# Patient Record
Sex: FEMALE | Race: WHITE | Employment: UNEMPLOYED | URBAN - METROPOLITAN AREA
[De-identification: names, ages, dates, MRNs, and addresses within clinical notes are randomized per-mention and may not be internally consistent; named-entity substitution may affect disease eponyms.]

---

## 2017-02-01 ENCOUNTER — OFFICE VISIT (OUTPATIENT)
Dept: FAMILY MEDICINE CLINIC | Age: 37
End: 2017-02-01

## 2017-02-01 VITALS
DIASTOLIC BLOOD PRESSURE: 87 MMHG | BODY MASS INDEX: 31.5 KG/M2 | OXYGEN SATURATION: 100 % | TEMPERATURE: 97.9 F | HEART RATE: 92 BPM | HEIGHT: 63 IN | SYSTOLIC BLOOD PRESSURE: 131 MMHG | RESPIRATION RATE: 16 BRPM | WEIGHT: 177.8 LBS

## 2017-02-01 DIAGNOSIS — F98.8 ADD (ATTENTION DEFICIT DISORDER): ICD-10-CM

## 2017-02-01 DIAGNOSIS — R87.618 OTHER ABNORMAL CYTOLOGICAL FINDING OF SPECIMEN FROM CERVIX: ICD-10-CM

## 2017-02-01 DIAGNOSIS — F32.89 OTHER DEPRESSION: Primary | ICD-10-CM

## 2017-02-01 RX ORDER — DEXTROAMPHETAMINE SACCHARATE, AMPHETAMINE ASPARTATE, DEXTROAMPHETAMINE SULFATE AND AMPHETAMINE SULFATE 7.5; 7.5; 7.5; 7.5 MG/1; MG/1; MG/1; MG/1
30 TABLET ORAL 2 TIMES DAILY
Qty: 60 TAB | Refills: 0 | Status: SHIPPED | OUTPATIENT
Start: 2017-02-01 | End: 2017-04-28 | Stop reason: SDUPTHER

## 2017-02-01 RX ORDER — DEXTROAMPHETAMINE SACCHARATE, AMPHETAMINE ASPARTATE, DEXTROAMPHETAMINE SULFATE AND AMPHETAMINE SULFATE 7.5; 7.5; 7.5; 7.5 MG/1; MG/1; MG/1; MG/1
30 TABLET ORAL 2 TIMES DAILY
Qty: 60 TAB | Refills: 0 | Status: SHIPPED | OUTPATIENT
Start: 2017-04-01 | End: 2017-04-28 | Stop reason: SDUPTHER

## 2017-02-01 RX ORDER — DEXTROAMPHETAMINE SACCHARATE, AMPHETAMINE ASPARTATE, DEXTROAMPHETAMINE SULFATE AND AMPHETAMINE SULFATE 7.5; 7.5; 7.5; 7.5 MG/1; MG/1; MG/1; MG/1
30 TABLET ORAL 2 TIMES DAILY
Qty: 60 TAB | Refills: 0 | Status: SHIPPED | OUTPATIENT
Start: 2017-03-01 | End: 2017-04-28 | Stop reason: SDUPTHER

## 2017-02-01 NOTE — PATIENT INSTRUCTIONS
Attention Deficit Hyperactivity Disorder (ADHD) in Adults: Care Instructions  Your Care Instructions  Attention deficit hyperactivity disorder, or ADHD, is a condition that makes it hard to pay attention. So you may have problems when you try to focus, get organized, and finish tasks. It might make you more active than other people. Or you might do things without thinking first.  ADHD is very common. It usually starts in early childhood. Many adults don't realize they have it until their children are diagnosed. Then they become aware of their own symptoms. Doctors don't know what causes ADHD. But it often runs in families. ADHD can be treated with medicines, behavior training, and counseling. Treatment can improve your life. Follow-up care is a key part of your treatment and safety. Be sure to make and go to all appointments, and call your doctor if you are having problems. It's also a good idea to know your test results and keep a list of the medicines you take. How can you care for yourself at home? · Learn all you can about ADHD. This will help you and your family understand it better. · Take your medicines exactly as prescribed. Call your doctor if you think you are having a problem with your medicine. You will get more details on the specific medicines your doctor prescribes. · If you miss a dose of your medicine, do not take an extra dose. · If your doctor suggests counseling, find a counselor you like and trust. Talk openly and honestly. Be willing to make some changes. · Find a support group for adults with ADHD. Talking to others with the same problems can help you feel better. It can also give you ideas about how to best cope with the condition. · Get rid of distractions at your work space. Keep your desk clean. Try not to face a window or busy hallway. · Use files, planners, and other tools to keep you organized. · Limit use of alcohol, and do not use illegal drugs.  People with ADHD tend to become addicted more easily than others. Tell your doctor if you need help to quit. Counseling, support groups, and sometimes medicines can help you stay free of alcohol or drugs. · Get at least 30 minutes of physical activity on most days of the week. Exercise has been shown to help people cope with ADHD. Walking is a good choice. You also may want to do other activities, such as running, swimming, cycling, or playing tennis or team sports. When should you call for help? Watch closely for changes in your health, and be sure to contact your doctor if:  · You feel sad a lot or cry all the time. · You have trouble sleeping, or you sleep too much. · You find it hard to concentrate, make decisions, or remember things. · You change how you normally eat. · You feel guilty for no reason. Where can you learn more? Go to http://chasidy-cari.info/. Enter B196 in the search box to learn more about \"Attention Deficit Hyperactivity Disorder (ADHD) in Adults: Care Instructions. \"  Current as of: July 26, 2016  Content Version: 11.1  © 7039-3160 [employer]. Care instructions adapted under license by [employer] (which disclaims liability or warranty for this information). If you have questions about a medical condition or this instruction, always ask your healthcare professional. [address] any warranty or liability for your use of this information.

## 2017-02-01 NOTE — PROGRESS NOTES
Prabhu Vasquez is a 43 y.o. female presents to office for medication refill. Pt requesting referral to GYN for enlarged uterus since her previous one . Pt also requesting new psych referral since that one is also . 1. Have you been to the ER, urgent care clinic or hospitalized since your last visit? no  2. Have you seen any other providers outside of [facility] since your last visit? No  3. Have you had a Flu shot this year?  Pt declined      Health Maintenance items with a due date reviewed with patient:  Health Maintenance Due   Topic Date Due    Pneumococcal 19-64 Medium Risk (1 of 1 - PPSV23) 1997    DTaP/Tdap/Td series (1 - Tdap) 1999    INFLUENZA AGE 9 TO ADULT  2016

## 2017-02-19 DIAGNOSIS — G43.009 NONINTRACTABLE MIGRAINE, UNSPECIFIED MIGRAINE TYPE: ICD-10-CM

## 2017-02-21 RX ORDER — BUTALBITAL, ACETAMINOPHEN AND CAFFEINE 50; 325; 40 MG/1; MG/1; MG/1
TABLET ORAL
Qty: 30 TAB | Refills: 3 | Status: SHIPPED | OUTPATIENT
Start: 2017-02-21 | End: 2017-08-03 | Stop reason: SDUPTHER

## 2017-04-28 ENCOUNTER — OFFICE VISIT (OUTPATIENT)
Dept: FAMILY MEDICINE CLINIC | Age: 37
End: 2017-04-28

## 2017-04-28 VITALS
WEIGHT: 169.2 LBS | RESPIRATION RATE: 18 BRPM | OXYGEN SATURATION: 98 % | SYSTOLIC BLOOD PRESSURE: 120 MMHG | DIASTOLIC BLOOD PRESSURE: 73 MMHG | HEART RATE: 90 BPM | BODY MASS INDEX: 29.98 KG/M2 | HEIGHT: 63 IN | TEMPERATURE: 98.2 F

## 2017-04-28 DIAGNOSIS — R63.4 WEIGHT LOSS: ICD-10-CM

## 2017-04-28 DIAGNOSIS — H02.9 LESION OF UPPER EYELID: Primary | ICD-10-CM

## 2017-04-28 DIAGNOSIS — M25.521 CHRONIC ELBOW PAIN, RIGHT: ICD-10-CM

## 2017-04-28 DIAGNOSIS — G89.29 CHRONIC ELBOW PAIN, RIGHT: ICD-10-CM

## 2017-04-28 DIAGNOSIS — F98.8 ADD (ATTENTION DEFICIT DISORDER): ICD-10-CM

## 2017-04-28 RX ORDER — DEXTROAMPHETAMINE SACCHARATE, AMPHETAMINE ASPARTATE, DEXTROAMPHETAMINE SULFATE AND AMPHETAMINE SULFATE 7.5; 7.5; 7.5; 7.5 MG/1; MG/1; MG/1; MG/1
30 TABLET ORAL 2 TIMES DAILY
Qty: 60 TAB | Refills: 0 | Status: SHIPPED | OUTPATIENT
Start: 2017-04-28 | End: 2017-08-03 | Stop reason: SDUPTHER

## 2017-04-28 RX ORDER — DEXTROAMPHETAMINE SACCHARATE, AMPHETAMINE ASPARTATE, DEXTROAMPHETAMINE SULFATE AND AMPHETAMINE SULFATE 7.5; 7.5; 7.5; 7.5 MG/1; MG/1; MG/1; MG/1
30 TABLET ORAL 2 TIMES DAILY
Qty: 60 TAB | Refills: 0 | Status: SHIPPED | OUTPATIENT
Start: 2017-06-28 | End: 2017-08-03 | Stop reason: SDUPTHER

## 2017-04-28 RX ORDER — DEXTROAMPHETAMINE SACCHARATE, AMPHETAMINE ASPARTATE, DEXTROAMPHETAMINE SULFATE AND AMPHETAMINE SULFATE 7.5; 7.5; 7.5; 7.5 MG/1; MG/1; MG/1; MG/1
30 TABLET ORAL 2 TIMES DAILY
Qty: 60 TAB | Refills: 0 | Status: SHIPPED | OUTPATIENT
Start: 2017-05-28 | End: 2017-08-03 | Stop reason: SDUPTHER

## 2017-04-28 RX ORDER — PHENTERMINE HYDROCHLORIDE 37.5 MG/1
37.5 TABLET ORAL
Qty: 30 TAB | Refills: 0 | Status: SHIPPED | OUTPATIENT
Start: 2017-04-28 | End: 2017-08-03 | Stop reason: SDUPTHER

## 2017-04-28 RX ORDER — PHENTERMINE HYDROCHLORIDE 37.5 MG/1
37.5 TABLET ORAL
Qty: 30 TAB | Refills: 0 | Status: SHIPPED | OUTPATIENT
Start: 2017-05-28 | End: 2017-08-03 | Stop reason: SDUPTHER

## 2017-04-28 NOTE — PROGRESS NOTES
Ange Thayer is a 43 y.o. female presents to office for medication refill. 1. Have you been to the ER, urgent care clinic or hospitalized since your last visit? no  2. Have you seen any other providers outside of [facility] [place] since your last visit? no  3.  Have you had a Flu shot this year? no      Health Maintenance items with a due date reviewed with patient:  Health Maintenance Due   Topic Date Due    Pneumococcal 19-64 Medium Risk (1 of 1 - PPSV23) 07/31/1997    DTaP/Tdap/Td series (1 - Tdap) 07/31/1999

## 2017-04-28 NOTE — PATIENT INSTRUCTIONS
Attention Deficit Hyperactivity Disorder (ADHD) in Adults: Care Instructions  Your Care Instructions  Attention deficit hyperactivity disorder, or ADHD, is a condition that makes it hard to pay attention. So you may have problems when you try to focus, get organized, and finish tasks. It might make you more active than other people. Or you might do things without thinking first.  ADHD is very common. It usually starts in early childhood. Many adults don't realize they have it until their children are diagnosed. Then they become aware of their own symptoms. Doctors don't know what causes ADHD. But it often runs in families. ADHD can be treated with medicines, behavior training, and counseling. Treatment can improve your life. Follow-up care is a key part of your treatment and safety. Be sure to make and go to all appointments, and call your doctor if you are having problems. It's also a good idea to know your test results and keep a list of the medicines you take. How can you care for yourself at home? · Learn all you can about ADHD. This will help you and your family understand it better. · Take your medicines exactly as prescribed. Call your doctor if you think you are having a problem with your medicine. You will get more details on the specific medicines your doctor prescribes. · If you miss a dose of your medicine, do not take an extra dose. · If your doctor suggests counseling, find a counselor you like and trust. Talk openly and honestly. Be willing to make some changes. · Find a support group for adults with ADHD. Talking to others with the same problems can help you feel better. It can also give you ideas about how to best cope with the condition. · Get rid of distractions at your work space. Keep your desk clean. Try not to face a window or busy hallway. · Use files, planners, and other tools to keep you organized. · Limit use of alcohol, and do not use illegal drugs.  People with ADHD tend to become addicted more easily than others. Tell your doctor if you need help to quit. Counseling, support groups, and sometimes medicines can help you stay free of alcohol or drugs. · Get at least 30 minutes of physical activity on most days of the week. Exercise has been shown to help people cope with ADHD. Walking is a good choice. You also may want to do other activities, such as running, swimming, cycling, or playing tennis or team sports. When should you call for help? Watch closely for changes in your health, and be sure to contact your doctor if:  · You feel sad a lot or cry all the time. · You have trouble sleeping, or you sleep too much. · You find it hard to concentrate, make decisions, or remember things. · You change how you normally eat. · You feel guilty for no reason. Where can you learn more? Go to http://chasidy-cari.info/. Enter B196 in the search box to learn more about \"Attention Deficit Hyperactivity Disorder (ADHD) in Adults: Care Instructions. \"  Current as of: July 26, 2016  Content Version: 11.2  © 0733-0680 [employer]. Care instructions adapted under license by [employer] (which disclaims liability or warranty for this information). If you have questions about a medical condition or this instruction, always ask your healthcare professional. [address] any warranty or liability for your use of this information.

## 2017-04-28 NOTE — PROGRESS NOTES
Fransisco Grider is a 43 y.o.  female and presents with med refills, eyelid lesion, weigh loss and referrals as below. Subjective: Additional Concerns: none    Patient Active Problem List    Diagnosis Date Noted    Depression 05/12/2016    Insomnia 03/24/2016    Pruritus 03/24/2016    History of iron deficiency anemia 03/24/2016    ADHD (attention deficit hyperactivity disorder) 08/05/2015    Migraines 08/05/2015    Weight gain 08/05/2015    Encounter for smoking cessation counseling 08/05/2015     Current Outpatient Prescriptions   Medication Sig Dispense Refill    butalbital-acetaminophen-caffeine (FIORICET, ESGIC) -40 mg per tablet TAKE 1 TABLET BY MOUTH EVERY 4 HOURS AS NEEDED FOR PAIN 30 Tab 3    dextroamphetamine-amphetamine (ADDERALL) 30 mg tablet Take 1 Tab by mouth two (2) times a dayEarliest Fill Date: 4/1/17. Max Daily Amount: 2 Tabs 60 Tab 0    dextroamphetamine-amphetamine (ADDERALL) 30 mg tablet Take 1 Tab by mouth two (2) times a dayEarliest Fill Date: 3/1/17. Max Daily Amount: 2 Tabs 60 Tab 0    dextroamphetamine-amphetamine (ADDERALL) 30 mg tablet Take 1 Tab by mouth two (2) times a dayEarliest Fill Date: 2/1/17. Max Daily Amount: 2 Tabs 60 Tab 0    phentermine (ADIPEX-P) 37.5 mg tablet Take 1 Tab by mouth every morning. Max Daily Amount: 37.5 mg. Indications: WEIGHT LOSS MANAGEMENT FOR OBESE PATIENT (BMI >= 30) 30 Tab 0    hydrOXYzine (ATARAX) 50 mg tablet Take 1 Tab by mouth three (3) times daily as needed for Itching. 90 Tab 5    SUMAtriptan (IMITREX) 50 mg tablet Take 1 Tab by mouth once as needed for Migraine for up to 1 dose. 30 Tab 1    traZODone (DESYREL) 100 mg tablet Take 1 Tab by mouth nightly. 30 Tab 5    HYDROcodone-acetaminophen (NORCO) 5-325 mg per tablet Take 1 Tab by mouth every four (4) hours as needed for Pain. Max Daily Amount: 6 Tabs. 20 Tab 0    citalopram (CELEXA) 40 mg tablet Take 1 Tab by mouth daily.  90 Tab 1    Multivit-Iron-Min-Folic Acid (CENTRUM ULTRA WOMEN'S) 18-0.4 mg tab Take one tab po daily 90 Tab 3     Allergies   Allergen Reactions    Other Plant, Animal, Environmental Sneezing     Seasonal       Past Medical History:   Diagnosis Date    ADHD (attention deficit hyperactivity disorder)     Depression     Encounter for smoking cessation counseling     Headache      Past Surgical History:   Procedure Laterality Date    HX  SECTION      HX TUBAL LIGATION      HX WISDOM TEETH EXTRACTION       Family History   Problem Relation Age of Onset   [facility] Cancer Father      brain    Depression Mother     Attention Deficit Disorder Sister     Cancer Maternal Grandmother      Social History   Substance Use Topics    Smoking status: Current Every Day Smoker     Packs/day: 0.50     Start date: 1997    Smokeless tobacco: Never Used    Alcohol use 0.0 oz/week     0 Standard drinks or equivalent per week      Comment: occassional     ROS     General: negative for - chills, fatigue, fever, positive weight gain weight change  Left upper eyelid - prominent bulge or cyst on the lateral aspect, non-painful  Psych: negative for - anxiety, depression, irritability or mood swings, positive for attention deficit.    Resp: negative for - cough, shortness of breath or wheezing  CV: negative for - chest pain, edema or palpitations  GI: negative for - abdominal pain, change in bowel habits, constipation, diarrhea or nausea/vomiting  : negative for - dysuria, hematuria, incontinence, pelvic pain or vulvar/vaginal symptoms  MSK: negative for - joint pain, joint swelling or muscle pain  Neuro: negative for - confusion, headaches, seizures or weakness    Objective:    PE    Alert, well appearing, and in no distress, oriented to person, place, and time and overweight  Mental status - alert, oriented to person, place, and time, normal mood, behavior, speech, dress, motor activity, and thought processes  Chest - clear to auscultation, no wheezes, rales or rhonchi, symmetric air entry  Heart - normal rate, regular rhythm, normal S1, S2, no murmurs, rubs, clicks or gallops  Extremities - peripheral pulses normal, no pedal edema, no clubbing or cyanosis    LABS   No visits with results within 6 Month(s) from this visit. Latest known visit with results is:    Hospital Outpatient Visit on 03/24/2016   Component Date Value Ref Range Status    WBC 03/24/2016 6.4  4.6 - 13.2 K/uL Final    RBC 03/24/2016 4.09* 4.20 - 5.30 M/uL Final    HGB 03/24/2016 12.7  12.0 - 16.0 g/dL Final    HCT 03/24/2016 38.8  35.0 - 45.0 % Final    MCV 03/24/2016 94.9  74.0 - 97.0 FL Final    MCH 03/24/2016 31.1  24.0 - 34.0 PG Final    MCHC 03/24/2016 32.7  31.0 - 37.0 g/dL Final    RDW 03/24/2016 13.8  11.6 - 14.5 % Final    PLATELET 40/19/6204 844  135 - 420 K/uL Final    MPV 03/24/2016 13.2* 9.2 - 11.8 FL Final    NEUTROPHILS 03/24/2016 67  40 - 73 % Final    LYMPHOCYTES 03/24/2016 24  21 - 52 % Final    MONOCYTES 03/24/2016 7  3 - 10 % Final    EOSINOPHILS 03/24/2016 1  0 - 5 % Final    BASOPHILS 03/24/2016 1  0 - 2 % Final    ABS. NEUTROPHILS 03/24/2016 4.3  1.8 - 8.0 K/UL Final    ABS. LYMPHOCYTES 03/24/2016 1.5  0.9 - 3.6 K/UL Final    ABS. MONOCYTES 03/24/2016 0.5  0.05 - 1.2 K/UL Final    ABS. EOSINOPHILS 03/24/2016 0.1  0.0 - 0.4 K/UL Final    ABS.  BASOPHILS 03/24/2016 0.0  0.0 - 0.06 K/UL Final    DF 03/24/2016 AUTOMATED    Final       TESTS  Results for orders placed or performed during the hospital encounter of 03/24/16   CBC WITH AUTOMATED DIFF   Result Value Ref Range    WBC 6.4 4.6 - 13.2 K/uL    RBC 4.09 (L) 4.20 - 5.30 M/uL    HGB 12.7 12.0 - 16.0 g/dL    HCT 38.8 35.0 - 45.0 %    MCV 94.9 74.0 - 97.0 FL    MCH 31.1 24.0 - 34.0 PG    MCHC 32.7 31.0 - 37.0 g/dL    RDW 13.8 11.6 - 14.5 %    PLATELET 865 805 - 066 K/uL    MPV 13.2 (H) 9.2 - 11.8 FL    NEUTROPHILS 67 40 - 73 %    LYMPHOCYTES 24 21 - 52 %    MONOCYTES 7 3 - 10 %    EOSINOPHILS 1 0 - 5 % BASOPHILS 1 0 - 2 %    ABS. NEUTROPHILS 4.3 1.8 - 8.0 K/UL    ABS. LYMPHOCYTES 1.5 0.9 - 3.6 K/UL    ABS. MONOCYTES 0.5 0.05 - 1.2 K/UL    ABS. EOSINOPHILS 0.1 0.0 - 0.4 K/UL    ABS. BASOPHILS 0.0 0.0 - 0.06 K/UL    DF AUTOMATED       Assessment/Plan:      1. ADD stable - Refilled Adderall same dosing x 3 months. 2. Weight gain - Trial of Phentermine for one month. Discussed potential side effects. 3. Chronic right elbow and heel pain - XR ordered. We will call for results and further plan. 4. Chronic left upper eyelid prominence S/P stye treatment with warm compresses - Referral to ophthalmology for definitive dx and possible treatment. Lab review: no lab studies available for review at time of visit. I have discussed the diagnosis with the patient and the intended plan as seen in the above orders. The patient has received an after-visit summary and questions were answered concerning future plans. I have discussed medication side effects and warnings with the patient as well. I have reviewed the plan of care with the patient, accepted their input and they are in agreement with the treatment goals.      F/U as needed    Philip Huerta MD

## 2017-05-23 RX ORDER — CITALOPRAM 40 MG/1
TABLET, FILM COATED ORAL
Qty: 90 TAB | Refills: 0 | Status: SHIPPED | OUTPATIENT
Start: 2017-05-23 | End: 2018-02-02 | Stop reason: SDUPTHER

## 2017-08-03 ENCOUNTER — OFFICE VISIT (OUTPATIENT)
Dept: FAMILY MEDICINE CLINIC | Age: 37
End: 2017-08-03

## 2017-08-03 VITALS
HEIGHT: 63 IN | BODY MASS INDEX: 29.34 KG/M2 | SYSTOLIC BLOOD PRESSURE: 126 MMHG | HEART RATE: 99 BPM | WEIGHT: 165.6 LBS | OXYGEN SATURATION: 97 % | DIASTOLIC BLOOD PRESSURE: 79 MMHG | RESPIRATION RATE: 16 BRPM | TEMPERATURE: 98.7 F

## 2017-08-03 DIAGNOSIS — L30.9 DERMATITIS: Primary | ICD-10-CM

## 2017-08-03 DIAGNOSIS — F98.8 ADD (ATTENTION DEFICIT DISORDER): ICD-10-CM

## 2017-08-03 DIAGNOSIS — G43.009 NONINTRACTABLE MIGRAINE, UNSPECIFIED MIGRAINE TYPE: ICD-10-CM

## 2017-08-03 DIAGNOSIS — R63.4 WEIGHT LOSS: ICD-10-CM

## 2017-08-03 RX ORDER — PHENTERMINE HYDROCHLORIDE 37.5 MG/1
37.5 TABLET ORAL
Qty: 30 TAB | Refills: 0 | Status: SHIPPED | OUTPATIENT
Start: 2017-08-03 | End: 2017-11-03 | Stop reason: SDUPTHER

## 2017-08-03 RX ORDER — DEXTROAMPHETAMINE SACCHARATE, AMPHETAMINE ASPARTATE, DEXTROAMPHETAMINE SULFATE AND AMPHETAMINE SULFATE 7.5; 7.5; 7.5; 7.5 MG/1; MG/1; MG/1; MG/1
30 TABLET ORAL 2 TIMES DAILY
Qty: 60 TAB | Refills: 0 | Status: SHIPPED | OUTPATIENT
Start: 2017-09-03 | End: 2017-11-03 | Stop reason: SDUPTHER

## 2017-08-03 RX ORDER — DEXTROAMPHETAMINE SACCHARATE, AMPHETAMINE ASPARTATE, DEXTROAMPHETAMINE SULFATE AND AMPHETAMINE SULFATE 7.5; 7.5; 7.5; 7.5 MG/1; MG/1; MG/1; MG/1
30 TABLET ORAL 2 TIMES DAILY
Qty: 60 TAB | Refills: 0 | Status: SHIPPED | OUTPATIENT
Start: 2017-08-03 | End: 2017-11-03 | Stop reason: SDUPTHER

## 2017-08-03 RX ORDER — BUTALBITAL, ACETAMINOPHEN AND CAFFEINE 50; 325; 40 MG/1; MG/1; MG/1
TABLET ORAL
Qty: 30 TAB | Refills: 3 | Status: SHIPPED | OUTPATIENT
Start: 2017-08-03 | End: 2018-02-02 | Stop reason: SDUPTHER

## 2017-08-03 RX ORDER — TRIAMCINOLONE ACETONIDE 1 MG/G
CREAM TOPICAL 2 TIMES DAILY
Qty: 30 G | Refills: 1 | Status: SHIPPED | OUTPATIENT
Start: 2017-08-03 | End: 2017-11-03 | Stop reason: SDUPTHER

## 2017-08-03 RX ORDER — PHENTERMINE HYDROCHLORIDE 37.5 MG/1
37.5 TABLET ORAL
Qty: 30 TAB | Refills: 0 | Status: SHIPPED | OUTPATIENT
Start: 2017-09-03 | End: 2017-11-03 | Stop reason: SDUPTHER

## 2017-08-03 RX ORDER — DEXTROAMPHETAMINE SACCHARATE, AMPHETAMINE ASPARTATE, DEXTROAMPHETAMINE SULFATE AND AMPHETAMINE SULFATE 7.5; 7.5; 7.5; 7.5 MG/1; MG/1; MG/1; MG/1
30 TABLET ORAL 2 TIMES DAILY
Qty: 60 TAB | Refills: 0 | Status: SHIPPED | OUTPATIENT
Start: 2017-10-03 | End: 2017-11-03 | Stop reason: SDUPTHER

## 2017-08-03 NOTE — PROGRESS NOTES
Kiersten Workman is a 42 y.o.  female and presents with med refills for ADD, migraines and weight loss. She also has a new skin rash on both arms that are pruritic. She thinks this maybe from recent sun exposure but   She has been trying to avoid the sun over 2 months and seems to be not betting better. Chief Complaint   Patient presents with    Medication Refill     Subjective: Additional Concerns: none    Patient Active Problem List    Diagnosis Date Noted    Depression 05/12/2016    Insomnia 03/24/2016    Pruritus 03/24/2016    History of iron deficiency anemia 03/24/2016    ADHD (attention deficit hyperactivity disorder) 08/05/2015    Migraines 08/05/2015    Weight gain 08/05/2015    Encounter for smoking cessation counseling 08/05/2015     Current Outpatient Prescriptions   Medication Sig Dispense Refill    [START ON 10/3/2017] dextroamphetamine-amphetamine (ADDERALL) 30 mg tablet Take 1 Tab by mouth two (2) times a dayEarliest Fill Date: 10/3/17. Max Daily Amount: 2 Tabs 60 Tab 0    [START ON 9/3/2017] dextroamphetamine-amphetamine (ADDERALL) 30 mg tablet Take 1 Tab by mouth two (2) times a dayEarliest Fill Date: 9/3/17. Max Daily Amount: 2 Tabs 60 Tab 0    dextroamphetamine-amphetamine (ADDERALL) 30 mg tablet Take 1 Tab by mouth two (2) times a dayEarliest Fill Date: 8/3/17. Max Daily Amount: 2 Tabs 60 Tab 0    [START ON 9/3/2017] phentermine (ADIPEX-P) 37.5 mg tablet Take 1 Tab by mouth every morning. Max Daily Amount: 37.5 mg. 30 Tab 0    phentermine (ADIPEX-P) 37.5 mg tablet Take 1 Tab by mouth every morning. Max Daily Amount: 37.5 mg. Indications: WEIGHT LOSS MANAGEMENT FOR OBESE PATIENT (BMI >= 30) 30 Tab 0    butalbital-acetaminophen-caffeine (FIORICET, ESGIC) -40 mg per tablet TAKE 1 TABLET BY MOUTH EVERY 4 HOURS AS NEEDED FOR PAIN 30 Tab 3    triamcinolone acetonide (KENALOG) 0.1 % topical cream Apply  to affected area two (2) times a day.  use thin layer BID x 2 weeks 30 g 1    citalopram (CELEXA) 40 mg tablet TAKE ONE TABLET BY MOUTH DAILY 90 Tab 0    hydrOXYzine (ATARAX) 50 mg tablet Take 1 Tab by mouth three (3) times daily as needed for Itching. 90 Tab 5    SUMAtriptan (IMITREX) 50 mg tablet Take 1 Tab by mouth once as needed for Migraine for up to 1 dose. 30 Tab 1    traZODone (DESYREL) 100 mg tablet Take 1 Tab by mouth nightly. 30 Tab 5    HYDROcodone-acetaminophen (NORCO) 5-325 mg per tablet Take 1 Tab by mouth every four (4) hours as needed for Pain. Max Daily Amount: 6 Tabs.  20 Tab 0    Multivit-Iron-Min-Folic Acid (CENTRUM ULTRA WOMEN'S) 18-0.4 mg tab Take one tab po daily 90 Tab 3     Allergies   Allergen Reactions    Other Plant, Animal, Environmental Sneezing     Seasonal       Past Medical History:   Diagnosis Date    ADHD (attention deficit hyperactivity disorder)     Depression     Encounter for smoking cessation counseling     Headache      Past Surgical History:   Procedure Laterality Date    HX  SECTION      HX TUBAL LIGATION      HX WISDOM TEETH EXTRACTION       Family History   Problem Relation Age of Onset   [facility] Cancer Father      brain    Depression Mother     Attention Deficit Disorder Sister     Cancer Maternal Grandmother      Social History   Substance Use Topics    Smoking status: Current Every Day Smoker     Packs/day: 0.50     Start date: 1997    Smokeless tobacco: Never Used    Alcohol use 0.0 oz/week     0 Standard drinks or equivalent per week      Comment: occassional     ROS     General: negative for - chills, fatigue, fever, weight change  Psych: negative for - anxiety, depression, irritability or mood swings, positive for attention deficit  Endo: negative for - hot flashes, polydipsia/polyuria or temperature intolerance  Resp: negative for - cough, shortness of breath or wheezing  CV: negative for - chest pain, edema or palpitations  MSK: negative for - joint pain, joint swelling or muscle pain  Neuro: negative for - confusion, headaches, seizures or weakness    Objective:  Vitals:    08/03/17 1100   BP: 126/79   Pulse: 99   Resp: 16   Temp: 98.7 °F (37.1 °C)   TempSrc: Oral   SpO2: 97%   Weight: 165 lb 9.6 oz (75.1 kg)   Height: 5' 3\" (1.6 m)   PainSc:   0 - No pain   LMP: 07/19/2017     PE    Alert, well appearing, and in no distress, oriented to person, place, and time and overweight  General appearance - alert, well appearing, and in no distress and oriented to person, place, and time  Mental status - alert, oriented to person, place, and time, normal mood, behavior, speech, dress, motor activity, and thought processes  Chest - clear to auscultation, no wheezes, rales or rhonchi, symmetric air entry  Heart - normal rate, regular rhythm, normal S1, S2, no murmurs, rubs, clicks or gallops  Extremities - peripheral pulses normal, no pedal edema, no clubbing or cyanosis    LABS   No visits with results within 6 Month(s) from this visit. Latest known visit with results is:    Hospital Outpatient Visit on 03/24/2016   Component Date Value Ref Range Status    WBC 03/24/2016 6.4  4.6 - 13.2 K/uL Final    RBC 03/24/2016 4.09* 4.20 - 5.30 M/uL Final    HGB 03/24/2016 12.7  12.0 - 16.0 g/dL Final    HCT 03/24/2016 38.8  35.0 - 45.0 % Final    MCV 03/24/2016 94.9  74.0 - 97.0 FL Final    MCH 03/24/2016 31.1  24.0 - 34.0 PG Final    MCHC 03/24/2016 32.7  31.0 - 37.0 g/dL Final    RDW 03/24/2016 13.8  11.6 - 14.5 % Final    PLATELET 83/87/7898 689  135 - 420 K/uL Final    MPV 03/24/2016 13.2* 9.2 - 11.8 FL Final    NEUTROPHILS 03/24/2016 67  40 - 73 % Final    LYMPHOCYTES 03/24/2016 24  21 - 52 % Final    MONOCYTES 03/24/2016 7  3 - 10 % Final    EOSINOPHILS 03/24/2016 1  0 - 5 % Final    BASOPHILS 03/24/2016 1  0 - 2 % Final    ABS. NEUTROPHILS 03/24/2016 4.3  1.8 - 8.0 K/UL Final    ABS. LYMPHOCYTES 03/24/2016 1.5  0.9 - 3.6 K/UL Final    ABS. MONOCYTES 03/24/2016 0.5  0.05 - 1.2 K/UL Final    ABS. EOSINOPHILS 03/24/2016 0.1  0.0 - 0.4 K/UL Final    ABS. BASOPHILS 03/24/2016 0.0  0.0 - 0.06 K/UL Final    DF 03/24/2016 AUTOMATED    Final       TESTS  Results for orders placed or performed during the hospital encounter of 03/24/16   CBC WITH AUTOMATED DIFF   Result Value Ref Range    WBC 6.4 4.6 - 13.2 K/uL    RBC 4.09 (L) 4.20 - 5.30 M/uL    HGB 12.7 12.0 - 16.0 g/dL    HCT 38.8 35.0 - 45.0 %    MCV 94.9 74.0 - 97.0 FL    MCH 31.1 24.0 - 34.0 PG    MCHC 32.7 31.0 - 37.0 g/dL    RDW 13.8 11.6 - 14.5 %    PLATELET 128 436 - 523 K/uL    MPV 13.2 (H) 9.2 - 11.8 FL    NEUTROPHILS 67 40 - 73 %    LYMPHOCYTES 24 21 - 52 %    MONOCYTES 7 3 - 10 %    EOSINOPHILS 1 0 - 5 %    BASOPHILS 1 0 - 2 %    ABS. NEUTROPHILS 4.3 1.8 - 8.0 K/UL    ABS. LYMPHOCYTES 1.5 0.9 - 3.6 K/UL    ABS. MONOCYTES 0.5 0.05 - 1.2 K/UL    ABS. EOSINOPHILS 0.1 0.0 - 0.4 K/UL    ABS. BASOPHILS 0.0 0.0 - 0.06 K/UL    DF AUTOMATED       Assessment/Plan:      1. ADD (attention deficit disorder) - stable   - dextroamphetamine-amphetamine (ADDERALL) 30 mg tablet; Take 1 Tab by mouth two (2) times a dayEarliest Fill Date: 10/3/17. Max Daily Amount: 2 Tabs  Dispense: 60 Tab; Refill: 0  - dextroamphetamine-amphetamine (ADDERALL) 30 mg tablet; Take 1 Tab by mouth two (2) times a dayEarliest Fill Date: 9/3/17. Max Daily Amount: 2 Tabs  Dispense: 60 Tab; Refill: 0  - dextroamphetamine-amphetamine (ADDERALL) 30 mg tablet; Take 1 Tab by mouth two (2) times a dayEarliest Fill Date: 8/3/17. Max Daily Amount: 2 Tabs  Dispense: 60 Tab; Refill: 0    2. Weight loss - stable   - phentermine (ADIPEX-P) 37.5 mg tablet; Take 1 Tab by mouth every morning. Max Daily Amount: 37.5 mg.  Dispense: 30 Tab; Refill: 0  - phentermine (ADIPEX-P) 37.5 mg tablet; Take 1 Tab by mouth every morning. Max Daily Amount: 37.5 mg. Indications: WEIGHT LOSS MANAGEMENT FOR OBESE PATIENT (BMI >= 30)  Dispense: 30 Tab; Refill: 0    3.  Nonintractable migraine, unspecified migraine type  - butalbital-acetaminophen-caffeine (FIORICET, ESGIC) -40 mg per tablet; TAKE 1 TABLET BY MOUTH EVERY 4 HOURS AS NEEDED FOR PAIN  Dispense: 30 Tab; Refill: 3    4. Dermatitis - Trial of kenalog cream 0.1% BID x one week  - REFERRAL TO DERMATOLOGY    Lab review: orders written for new lab studies as appropriate; see orders. I have discussed the diagnosis with the patient and the intended plan as seen in the above orders. The patient has received an after-visit summary and questions were answered concerning future plans. I have discussed medication side effects and warnings with the patient as well. I have reviewed the plan of care with the patient, accepted their input and they are in agreement with the treatment goals. Follow-up Disposition:  Return in about 3 months (around 11/3/2017), or if symptoms worsen or fail to improve.     Elizabeth Juarez MD

## 2017-08-03 NOTE — PATIENT INSTRUCTIONS
Abnormal Weight Loss: Care Instructions  Your Care Instructions  There are two types of weight loss--normal and abnormal. The normal kind happens when you are trying to lose weight by exercising more or eating less. The abnormal kind happens when you are not trying to lose weight. Many medical problems can cause abnormal weight loss. These include problems with your thyroid gland, long-term infections, mouth or throat problems that make it hard to eat, and digestive problems. They also include depression and cancer. Some medicines also may cause you to lose weight. You can work with your doctor to find the cause of your weight loss. You will probably need tests to do this. Follow-up care is a key part of your treatment and safety. Be sure to make and go to all appointments, and call your doctor if you are having problems. It's also a good idea to know your test results and keep a list of the medicines you take. How can you care for yourself at home? · Weigh yourself at the same time every day. It's best to do it first thing in the morning after you empty your bladder. Be sure to always wear the same amount of clothing. · Write down any changes in your weight and the possible causes. Discuss these with your doctor. · Your doctor may want you to change your diet. Do your best to follow his or her advice. · Ask your doctor if you should see a dietitian. This is a person who can help you plan meals that work best for your lifestyle. · Note any changes in bowel habits. These may include changes in how often you have a bowel movement. Other changes include the color and size of your stools and how solid they are. · If you are prescribed medicines, take them exactly as prescribed. Call your doctor if you think you are having a problem with your medicine. You will get more details on the specific medicines your doctor prescribes. When should you call for help?   Watch closely for changes in your health, and be sure to contact your doctor if:  · You do not get better as expected. · You continue to lose weight. Where can you learn more? Go to http://chasidy-cari.info/. Enter A790 in the search box to learn more about \"Abnormal Weight Loss: Care Instructions. \"  Current as of: October 13, 2016  Content Version: 11.3  © 0598-7805 [employer]. Care instructions adapted under license by [employer] (which disclaims liability or warranty for this information). If you have questions about a medical condition or this instruction, always ask your healthcare professional. [place] [address] any warranty or liability for your use of this information. Migraine Headache: Care Instructions  Your Care Instructions  Migraines are painful, throbbing headaches that often start on one side of the head. They may cause nausea and vomiting and make you sensitive to light, sound, or smell. Without treatment, migraines can last from 4 hours to a few days. Medicines can help prevent migraines or stop them after they have started. Your doctor can help you find which ones work best for you. Follow-up care is a key part of your treatment and safety. Be sure to make and go to all appointments, and call your doctor if you are having problems. It's also a good idea to know your test results and keep a list of the medicines you take. How can you care for yourself at home? · Do not drive if you have taken a prescription pain medicine. · Rest in a quiet, dark room until your headache is gone. Close your eyes, and try to relax or go to sleep. Don't watch TV or read. · Put a cold, moist cloth or cold pack on the painful area for 10 to 20 minutes at a time. Put a thin cloth between the cold pack and your skin. · Use a warm, moist towel or a heating pad set on low to relax tight shoulder and neck muscles. · Have someone gently massage your neck and shoulders.   · Take your medicines exactly as prescribed. Call your doctor if you think you are having a problem with your medicine. You will get more details on the specific medicines your doctor prescribes. · Be careful not to take pain medicine more often than the instructions allow. You could get worse or more frequent headaches when the medicine wears off. To prevent migraines  · Keep a headache diary so you can figure out what triggers your headaches. Avoiding triggers may help you prevent headaches. Record when each headache began, how long it lasted, and what the pain was like. (Was it throbbing, aching, stabbing, or dull?) Write down any other symptoms you had with the headache, such as nausea, flashing lights or dark spots, or sensitivity to bright light or loud noise. Note if the headache occurred near your period. List anything that might have triggered the headache. Triggers may include certain foods (chocolate, cheese, wine) or odors, smoke, bright light, stress, or lack of sleep. · If your doctor has prescribed medicine for your migraines, take it as directed. You may have medicine that you take only when you get a migraine and medicine that you take all the time to help prevent migraines. ¨ If your doctor has prescribed medicine for when you get a headache, take it at the first sign of a migraine, unless your doctor has given you other instructions. ¨ If your doctor has prescribed medicine to prevent migraines, take it exactly as prescribed. Call your doctor if you think you are having a problem with your medicine. · Find healthy ways to deal with stress. Migraines are most common during or right after stressful times. Take time to relax before and after you do something that has caused a migraine in the past.  · Try to keep your muscles relaxed by keeping good posture. Check your jaw, face, neck, and shoulder muscles for tension. Try to relax them. When you sit at a desk, change positions often.  And make sure to stretch for 30 seconds each hour. · Get plenty of sleep and exercise. · Eat meals on a regular schedule. Avoid foods and drinks that often trigger migraines. These include chocolate, alcohol (especially red wine and port), aspartame, monosodium glutamate (MSG), and some additives found in foods (such as hot dogs, ramsey, cold cuts, aged cheeses, and pickled foods). · Limit caffeine. Don't drink too much coffee, tea, or soda. But don't quit caffeine suddenly. That can also give you migraines. · Do not smoke or allow others to smoke around you. If you need help quitting, talk to your doctor about stop-smoking programs and medicines. These can increase your chances of quitting for good. · If you are taking birth control pills or hormone therapy, talk to your doctor about whether they are triggering your migraines. When should you call for help? Call 911 anytime you think you may need emergency care. For example, call if:  · You have signs of a stroke. These may include:  ¨ Sudden numbness, paralysis, or weakness in your face, arm, or leg, especially on only one side of your body. ¨ Sudden vision changes. ¨ Sudden trouble speaking. ¨ Sudden confusion or trouble understanding simple statements. ¨ Sudden problems with walking or balance. ¨ A sudden, severe headache that is different from past headaches. Call your doctor now or seek immediate medical care if:  · You have new or worse nausea and vomiting. · You have a new or higher fever. · Your headache gets much worse. Watch closely for changes in your health, and be sure to contact your doctor if:  · You are not getting better after 2 days (48 hours). Where can you learn more? Go to http://chasidy-cari.info/. Enter D046 in the search box to learn more about \"Migraine Headache: Care Instructions. \"  Current as of: October 14, 2016  Content Version: 11.3  © 1330-5665 [facility].  Care instructions adapted under license by [facility] (which disclaims liability or warranty for this information). If you have questions about a medical condition or this instruction, always ask your healthcare professional. [address] any warranty or liability for your use of this information. Attention Deficit Hyperactivity Disorder (ADHD) in Adults: Care Instructions  Your Care Instructions  Attention deficit hyperactivity disorder, or ADHD, is a condition that makes it hard to pay attention. So you may have problems when you try to focus, get organized, and finish tasks. It might make you more active than other people. Or you might do things without thinking first.  ADHD is very common. It usually starts in early childhood. Many adults don't realize they have it until their children are diagnosed. Then they become aware of their own symptoms. Doctors don't know what causes ADHD. But it often runs in families. ADHD can be treated with medicines, behavior training, and counseling. Treatment can improve your life. Follow-up care is a key part of your treatment and safety. Be sure to make and go to all appointments, and call your doctor if you are having problems. It's also a good idea to know your test results and keep a list of the medicines you take. How can you care for yourself at home? · Learn all you can about ADHD. This will help you and your family understand it better. · Take your medicines exactly as prescribed. Call your doctor if you think you are having a problem with your medicine. You will get more details on the specific medicines your doctor prescribes. · If you miss a dose of your medicine, do not take an extra dose. · If your doctor suggests counseling, find a counselor you like and trust. Talk openly and honestly. Be willing to make some changes. · Find a support group for adults with ADHD. Talking to others with the same problems can help you feel better.  It can also give you ideas about how to best cope with the condition. · Get rid of distractions at your work space. Keep your desk clean. Try not to face a window or busy hallway. · Use files, planners, and other tools to keep you organized. · Limit use of alcohol, and do not use illegal drugs. People with ADHD tend to become addicted more easily than others. Tell your doctor if you need help to quit. Counseling, support groups, and sometimes medicines can help you stay free of alcohol or drugs. · Get at least 30 minutes of physical activity on most days of the week. Exercise has been shown to help people cope with ADHD. Walking is a good choice. You also may want to do other activities, such as running, swimming, cycling, or playing tennis or team sports. When should you call for help? Watch closely for changes in your health, and be sure to contact your doctor if:  · You feel sad a lot or cry all the time. · You have trouble sleeping, or you sleep too much. · You find it hard to concentrate, make decisions, or remember things. · You change how you normally eat. · You feel guilty for no reason. Where can you learn more? Go to http://chasidy-cari.info/. Enter B196 in the search box to learn more about \"Attention Deficit Hyperactivity Disorder (ADHD) in Adults: Care Instructions. \"  Current as of: July 26, 2016  Content Version: 11.3  © 0390-2912 [employer]. Care instructions adapted under license by [employer] (which disclaims liability or warranty for this information). If you have questions about a medical condition or this instruction, always ask your healthcare professional. [place] [address] any warranty or liability for your use of this information.

## 2017-08-03 NOTE — MR AVS SNAPSHOT
Visit Information Date & Time Provider Department Dept. Phone Encounter #  
 8/3/2017 10:45 AM Jose Tenorio MD [facility] [place] [phone] [id number] Follow-up Instructions Return in about 3 months (around 11/3/2017), or if symptoms worsen or fail to improve. Upcoming Health Maintenance Date Due Pneumococcal 19-64 Medium Risk (1 of 1 - PPSV23) 7/31/1997 DTaP/Tdap/Td series (1 - Tdap) 7/31/1999 INFLUENZA AGE 9 TO ADULT 8/1/2017 PAP AKA CERVICAL CYTOLOGY 11/23/2018 Allergies as of 8/3/2017  Review Complete On: 8/3/2017 By: Maurice Bhatti LPN Severity Noted Reaction Type Reactions Other Plant, Animal, Environmental  08/05/2015    Sneezing Seasonal  
  
Current Immunizations  Never Reviewed No immunizations on file. Not reviewed this visit You Were Diagnosed With   
  
 Codes Comments Dermatitis    -  Primary ICD-10-CM: L30.9 ICD-9-CM: 692.9 ADD (attention deficit disorder)     ICD-10-CM: F98.8 ICD-9-CM: 314.00 Weight loss     ICD-10-CM: R63.4 ICD-9-CM: 783.21 Nonintractable migraine, unspecified migraine type     ICD-10-CM: G43.009 ICD-9-CM: 346.10 Vitals BP Pulse Temp Resp Height(growth percentile) Weight(growth percentile) 126/79 (BP 1 Location: Left arm, BP Patient Position: Sitting) 99 98.7 °F (37.1 °C) (Oral) 16 5' 3\" (1.6 m) 165 lb 9.6 oz (75.1 kg) LMP SpO2 BMI OB Status Smoking Status 07/19/2017 (Exact Date) 97% 29.33 kg/m2 Having regular periods Current Every Day Smoker BMI and BSA Data Body Mass Index Body Surface Area  
 29.33 kg/m 2 1.83 m 2 Preferred Pharmacy Pharmacy Name Phone [facility] [address] [place] [address]. [address] [phone] Your Updated Medication List  
  
   
This list is accurate as of: 8/3/17 11:14 AM.  Always use your most recent med list.  
  
  
  
  
 butalbital-acetaminophen-caffeine -40 mg per tablet Commonly known as:  FIORICET, ESGIC  
TAKE 1 TABLET BY MOUTH EVERY 4 HOURS AS NEEDED FOR PAIN  
  
 citalopram 40 mg tablet Commonly known as:  CELEXA  
TAKE ONE TABLET BY MOUTH DAILY  
  
 * dextroamphetamine-amphetamine 30 mg tablet Commonly known as:  ADDERALL Take 1 Tab by mouth two (2) times a dayEarliest Fill Date: 8/3/17. Max Daily Amount: 2 Tabs * dextroamphetamine-amphetamine 30 mg tablet Commonly known as:  ADDERALL Take 1 Tab by mouth two (2) times a dayEarliest Fill Date: 9/3/17. Max Daily Amount: 2 Tabs Start taking on:  9/3/2017 * dextroamphetamine-amphetamine 30 mg tablet Commonly known as:  ADDERALL Take 1 Tab by mouth two (2) times a dayEarliest Fill Date: 10/3/17. Max Daily Amount: 2 Tabs Start taking on:  10/3/2017 HYDROcodone-acetaminophen 5-325 mg per tablet Commonly known as:  Tina Granados Take 1 Tab by mouth every four (4) hours as needed for Pain. Max Daily Amount: 6 Tabs. hydrOXYzine HCl 50 mg tablet Commonly known as:  ATARAX Take 1 Tab by mouth three (3) times daily as needed for Itching. Multivit-Iron-Min-Folic Acid 84-6.9 mg Tab Commonly known as:  CENTRUM ULTRA WOMEN'S Take one tab po daily * phentermine 37.5 mg tablet Commonly known as:  ADIPEX-P Take 1 Tab by mouth every morning. Max Daily Amount: 37.5 mg. Indications: WEIGHT LOSS MANAGEMENT FOR OBESE PATIENT (BMI >= 30) * phentermine 37.5 mg tablet Commonly known as:  ADIPEX-P Take 1 Tab by mouth every morning. Max Daily Amount: 37.5 mg.  
Start taking on:  9/3/2017 SUMAtriptan 50 mg tablet Commonly known as:  IMITREX Take 1 Tab by mouth once as needed for Migraine for up to 1 dose. traZODone 100 mg tablet Commonly known as:  Vika Deyvi Take 1 Tab by mouth nightly. triamcinolone acetonide 0.1 % topical cream  
Commonly known as:  KENALOG Apply  to affected area two (2) times a day. use thin layer BID x 2 weeks * Notice: This list has 5 medication(s) that are the same as other medications prescribed for you. Read the directions carefully, and ask your doctor or other care provider to review them with you. Prescriptions Printed Refills  
 dextroamphetamine-amphetamine (ADDERALL) 30 mg tablet 0 Starting on: 10/3/2017 Sig: Take 1 Tab by mouth two (2) times a dayEarliest Fill Date: 10/3/17. Max Daily Amount: 2 Tabs Class: Print Route: Oral  
 dextroamphetamine-amphetamine (ADDERALL) 30 mg tablet 0 Starting on: 9/3/2017 Sig: Take 1 Tab by mouth two (2) times a dayEarliest Fill Date: 9/3/17. Max Daily Amount: 2 Tabs Class: Print Route: Oral  
 dextroamphetamine-amphetamine (ADDERALL) 30 mg tablet 0 Sig: Take 1 Tab by mouth two (2) times a dayEarliest Fill Date: 8/3/17. Max Daily Amount: 2 Tabs Class: Print Route: Oral  
 phentermine (ADIPEX-P) 37.5 mg tablet 0 Starting on: 9/3/2017 Sig: Take 1 Tab by mouth every morning. Max Daily Amount: 37.5 mg.  
 Class: Print Route: Oral  
 phentermine (ADIPEX-P) 37.5 mg tablet 0 Sig: Take 1 Tab by mouth every morning. Max Daily Amount: 37.5 mg. Indications: WEIGHT LOSS MANAGEMENT FOR OBESE PATIENT (BMI >= 30) Class: Print Route: Oral  
 butalbital-acetaminophen-caffeine (FIORICET, ESGIC) -40 mg per tablet 3 Sig: TAKE 1 TABLET BY MOUTH EVERY 4 HOURS AS NEEDED FOR PAIN Class: Print Prescriptions Sent to Pharmacy Refills  
 triamcinolone acetonide (KENALOG) 0.1 % topical cream 1 Sig: Apply  to affected area two (2) times a day. use thin layer BID x 2 weeks Class: Normal  
 Pharmacy: [facility] [address] [place] [address]. [address]  #: [phone] Route: Topical  
  
We Performed the Following REFERRAL TO DERMATOLOGY [REF19 Custom] Comments: Please evaluate patient for pruritus and skin rash. Follow-up Instructions Return in about 3 months (around 11/3/2017), or if symptoms worsen or fail to improve. Referral Information Referral ID Referred By Referred To  
  
 [id number] Armin Bonner Not Available Visits Status Start Date End Date 1 New Request 8/3/17 8/3/18 If your referral has a status of pending review or denied, additional information will be sent to support the outcome of this decision. Patient Instructions Abnormal Weight Loss: Care Instructions Your Care Instructions There are two types of weight lossnormal and abnormal. The normal kind happens when you are trying to lose weight by exercising more or eating less. The abnormal kind happens when you are not trying to lose weight. Many medical problems can cause abnormal weight loss. These include problems with your thyroid gland, long-term infections, mouth or throat problems that make it hard to eat, and digestive problems. They also include depression and cancer. Some medicines also may cause you to lose weight. You can work with your doctor to find the cause of your weight loss. You will probably need tests to do this. Follow-up care is a key part of your treatment and safety. Be sure to make and go to all appointments, and call your doctor if you are having problems. It's also a good idea to know your test results and keep a list of the medicines you take. How can you care for yourself at home? · Weigh yourself at the same time every day. It's best to do it first thing in the morning after you empty your bladder. Be sure to always wear the same amount of clothing. · Write down any changes in your weight and the possible causes. Discuss these with your doctor. · Your doctor may want you to change your diet. Do your best to follow his or her advice. · Ask your doctor if you should see a dietitian.  This is a person who can help you plan meals that work best for your lifestyle. · Note any changes in bowel habits. These may include changes in how often you have a bowel movement. Other changes include the color and size of your stools and how solid they are. · If you are prescribed medicines, take them exactly as prescribed. Call your doctor if you think you are having a problem with your medicine. You will get more details on the specific medicines your doctor prescribes. When should you call for help? Watch closely for changes in your health, and be sure to contact your doctor if: 
· You do not get better as expected. · You continue to lose weight. Where can you learn more? Go to http://chasidy-cari.info/. Enter A790 in the search box to learn more about \"Abnormal Weight Loss: Care Instructions. \" Current as of: October 13, 2016 Content Version: 11.3 © 9887-4998 [employer]. Care instructions adapted under license by [employer] (which disclaims liability or warranty for this information). If you have questions about a medical condition or this instruction, always ask your healthcare professional. [address] any warranty or liability for your use of this information. Migraine Headache: Care Instructions Your Care Instructions Migraines are painful, throbbing headaches that often start on one side of the head. They may cause nausea and vomiting and make you sensitive to light, sound, or smell. Without treatment, migraines can last from 4 hours to a few days. Medicines can help prevent migraines or stop them after they have started. Your doctor can help you find which ones work best for you. Follow-up care is a key part of your treatment and safety. Be sure to make and go to all appointments, and call your doctor if you are having problems. It's also a good idea to know your test results and keep a list of the medicines you take. How can you care for yourself at home? · Do not drive if you have taken a prescription pain medicine. · Rest in a quiet, dark room until your headache is gone. Close your eyes, and try to relax or go to sleep. Don't watch TV or read. · Put a cold, moist cloth or cold pack on the painful area for 10 to 20 minutes at a time. Put a thin cloth between the cold pack and your skin. · Use a warm, moist towel or a heating pad set on low to relax tight shoulder and neck muscles. · Have someone gently massage your neck and shoulders. · Take your medicines exactly as prescribed. Call your doctor if you think you are having a problem with your medicine. You will get more details on the specific medicines your doctor prescribes. · Be careful not to take pain medicine more often than the instructions allow. You could get worse or more frequent headaches when the medicine wears off. To prevent migraines · Keep a headache diary so you can figure out what triggers your headaches. Avoiding triggers may help you prevent headaches. Record when each headache began, how long it lasted, and what the pain was like. (Was it throbbing, aching, stabbing, or dull?) Write down any other symptoms you had with the headache, such as nausea, flashing lights or dark spots, or sensitivity to bright light or loud noise. Note if the headache occurred near your period. List anything that might have triggered the headache. Triggers may include certain foods (chocolate, cheese, wine) or odors, smoke, bright light, stress, or lack of sleep. · If your doctor has prescribed medicine for your migraines, take it as directed. You may have medicine that you take only when you get a migraine and medicine that you take all the time to help prevent migraines. ¨ If your doctor has prescribed medicine for when you get a headache, take it at the first sign of a migraine, unless your doctor has given you other instructions. ¨ If your doctor has prescribed medicine to prevent migraines, take it exactly as prescribed. Call your doctor if you think you are having a problem with your medicine. · Find healthy ways to deal with stress. Migraines are most common during or right after stressful times. Take time to relax before and after you do something that has caused a migraine in the past. 
· Try to keep your muscles relaxed by keeping good posture. Check your jaw, face, neck, and shoulder muscles for tension. Try to relax them. When you sit at a desk, change positions often. And make sure to stretch for 30 seconds each hour. · Get plenty of sleep and exercise. · Eat meals on a regular schedule. Avoid foods and drinks that often trigger migraines. These include chocolate, alcohol (especially red wine and port), aspartame, monosodium glutamate (MSG), and some additives found in foods (such as hot dogs, ramsey, cold cuts, aged cheeses, and pickled foods). · Limit caffeine. Don't drink too much coffee, tea, or soda. But don't quit caffeine suddenly. That can also give you migraines. · Do not smoke or allow others to smoke around you. If you need help quitting, talk to your doctor about stop-smoking programs and medicines. These can increase your chances of quitting for good. · If you are taking birth control pills or hormone therapy, talk to your doctor about whether they are triggering your migraines. When should you call for help? Call 911 anytime you think you may need emergency care. For example, call if: 
· You have signs of a stroke. These may include: 
¨ Sudden numbness, paralysis, or weakness in your face, arm, or leg, especially on only one side of your body. ¨ Sudden vision changes. ¨ Sudden trouble speaking. ¨ Sudden confusion or trouble understanding simple statements. ¨ Sudden problems with walking or balance. ¨ A sudden, severe headache that is different from past headaches. Call your doctor now or seek immediate medical care if: 
· You have new or worse nausea and vomiting. · You have a new or higher fever. · Your headache gets much worse. Watch closely for changes in your health, and be sure to contact your doctor if: 
· You are not getting better after 2 days (48 hours). Where can you learn more? Go to http://chasidy-cari.info/. Enter P631 in the search box to learn more about \"Migraine Headache: Care Instructions. \" Current as of: October 14, 2016 Content Version: 11.3 © 4222-7589 [employer]. Care instructions adapted under license by [employer] (which disclaims liability or warranty for this information). If you have questions about a medical condition or this instruction, always ask your healthcare professional. [address] any warranty or liability for your use of this information. Attention Deficit Hyperactivity Disorder (ADHD) in Adults: Care Instructions Your Care Instructions Attention deficit hyperactivity disorder, or ADHD, is a condition that makes it hard to pay attention. So you may have problems when you try to focus, get organized, and finish tasks. It might make you more active than other people. Or you might do things without thinking first. 
ADHD is very common. It usually starts in early childhood. Many adults don't realize they have it until their children are diagnosed. Then they become aware of their own symptoms. Doctors don't know what causes ADHD. But it often runs in families. ADHD can be treated with medicines, behavior training, and counseling. Treatment can improve your life. Follow-up care is a key part of your treatment and safety. Be sure to make and go to all appointments, and call your doctor if you are having problems. It's also a good idea to know your test results and keep a list of the medicines you take. How can you care for yourself at home? · Learn all you can about ADHD. This will help you and your family understand it better. · Take your medicines exactly as prescribed. Call your doctor if you think you are having a problem with your medicine. You will get more details on the specific medicines your doctor prescribes. · If you miss a dose of your medicine, do not take an extra dose. · If your doctor suggests counseling, find a counselor you like and trust. Talk openly and honestly. Be willing to make some changes. · Find a support group for adults with ADHD. Talking to others with the same problems can help you feel better. It can also give you ideas about how to best cope with the condition. · Get rid of distractions at your work space. Keep your desk clean. Try not to face a window or busy hallway. · Use files, planners, and other tools to keep you organized. · Limit use of alcohol, and do not use illegal drugs. People with ADHD tend to become addicted more easily than others. Tell your doctor if you need help to quit. Counseling, support groups, and sometimes medicines can help you stay free of alcohol or drugs. · Get at least 30 minutes of physical activity on most days of the week. Exercise has been shown to help people cope with ADHD. Walking is a good choice. You also may want to do other activities, such as running, swimming, cycling, or playing tennis or team sports. When should you call for help? Watch closely for changes in your health, and be sure to contact your doctor if: 
· You feel sad a lot or cry all the time. · You have trouble sleeping, or you sleep too much. · You find it hard to concentrate, make decisions, or remember things. · You change how you normally eat. · You feel guilty for no reason. Where can you learn more? Go to http://chasidy-cari.info/. Enter B196 in the search box to learn more about \"Attention Deficit Hyperactivity Disorder (ADHD) in Adults: Care Instructions. \" 
 Current as of: July 26, 2016 Content Version: 11.3 © 1426-2761 [employer]rr[employer]. Care instructions adapted under license by [employer] (which disclaims liability or warranty for this information). If you have questions about a medical condition or this instruction, always ask your healthcare professional. [address] any warranty or liability for your use of this information. Introducing [facility]! [facility] introduces [employer] patient portal. Now you can access parts of your medical record, email your doctor's office, and request medication refills online. 1. In your internet browser, go to [email or web address] 2. Click on the First Time User? Click Here link in the Sign In box. You will see the New Member Sign Up page. 3. Enter your [employer] Access Code exactly as it appears below. You will not need to use this code after youve completed the sign-up process. If you do not sign up before the expiration date, you must request a new code. · [employer] Access Code: [email or web address] Expires: 11/1/2017 11:14 AM 
 
4. Enter the last four digits of your Social Security Number (xxxx) and Date of Birth (mm/dd/yyyy) as indicated and click Submit. You will be taken to the next sign-up page. 5. Create a [employer] ID. This will be your [employer] login ID and cannot be changed, so think of one that is secure and easy to remember. 6. Create a [employer] password. You can change your password at any time. 7. Enter your Password Reset Question and Answer. This can be used at a later time if you forget your password. 8. Enter your e-mail address. You will receive e-mail notification when new information is available in [address] 9. Click Sign Up. You can now view and download portions of your medical record. 10. Click the Download Summary menu link to download a portable copy of your medical information. If you have questions, please visit the Frequently Asked Questions section of the [employer] website. Remember, ArmorText is NOT to be used for urgent needs. For medical emergencies, dial 911. Now available from your iPhone and Android! Please provide this summary of care documentation to your next provider. Your primary care clinician is listed as Elio Fletcher. If you have any questions after today's visit, please call [phone].

## 2017-08-03 NOTE — PROGRESS NOTES
Estela Banks is a 42 y.o. female presents to office for medication refill. 1. Have you been to the ER, urgent care clinic or hospitalized since your last visit? no  2. Have you seen any other providers outside of [facility] since your last visit? no  3.  Have you had a Flu shot this year? no      Health Maintenance items with a due date reviewed with patient:  Health Maintenance Due   Topic Date Due    Pneumococcal 19-64 Medium Risk (1 of 1 - PPSV23) 07/31/1997    DTaP/Tdap/Td series (1 - Tdap) 07/31/1999    INFLUENZA AGE 9 TO ADULT  08/01/2017

## 2017-11-03 ENCOUNTER — OFFICE VISIT (OUTPATIENT)
Dept: FAMILY MEDICINE CLINIC | Age: 37
End: 2017-11-03

## 2017-11-03 VITALS
WEIGHT: 156.2 LBS | HEIGHT: 63 IN | SYSTOLIC BLOOD PRESSURE: 147 MMHG | TEMPERATURE: 98 F | OXYGEN SATURATION: 100 % | BODY MASS INDEX: 27.68 KG/M2 | DIASTOLIC BLOOD PRESSURE: 88 MMHG | RESPIRATION RATE: 17 BRPM | HEART RATE: 98 BPM

## 2017-11-03 DIAGNOSIS — L29.9 PRURITUS: ICD-10-CM

## 2017-11-03 DIAGNOSIS — R63.4 WEIGHT LOSS: ICD-10-CM

## 2017-11-03 DIAGNOSIS — M25.521 CHRONIC ELBOW PAIN, RIGHT: Primary | ICD-10-CM

## 2017-11-03 DIAGNOSIS — F98.8 ATTENTION DEFICIT DISORDER, UNSPECIFIED HYPERACTIVITY PRESENCE: ICD-10-CM

## 2017-11-03 DIAGNOSIS — G89.29 CHRONIC ELBOW PAIN, RIGHT: Primary | ICD-10-CM

## 2017-11-03 RX ORDER — DEXTROAMPHETAMINE SACCHARATE, AMPHETAMINE ASPARTATE, DEXTROAMPHETAMINE SULFATE AND AMPHETAMINE SULFATE 7.5; 7.5; 7.5; 7.5 MG/1; MG/1; MG/1; MG/1
30 TABLET ORAL 2 TIMES DAILY
Qty: 60 TAB | Refills: 0 | Status: SHIPPED | OUTPATIENT
Start: 2017-11-03 | End: 2018-02-02 | Stop reason: SDUPTHER

## 2017-11-03 RX ORDER — PHENTERMINE HYDROCHLORIDE 37.5 MG/1
37.5 TABLET ORAL
Qty: 30 TAB | Refills: 0 | Status: SHIPPED | OUTPATIENT
Start: 2018-01-03 | End: 2018-05-03 | Stop reason: SDUPTHER

## 2017-11-03 RX ORDER — PHENTERMINE HYDROCHLORIDE 37.5 MG/1
37.5 TABLET ORAL
Qty: 30 TAB | Refills: 0 | Status: SHIPPED | OUTPATIENT
Start: 2017-11-03 | End: 2018-05-03 | Stop reason: SDUPTHER

## 2017-11-03 RX ORDER — TRIAMCINOLONE ACETONIDE 1 MG/G
CREAM TOPICAL 2 TIMES DAILY
Qty: 30 G | Refills: 1 | Status: SHIPPED | OUTPATIENT
Start: 2017-11-03 | End: 2018-02-02 | Stop reason: SDUPTHER

## 2017-11-03 RX ORDER — DEXTROAMPHETAMINE SACCHARATE, AMPHETAMINE ASPARTATE, DEXTROAMPHETAMINE SULFATE AND AMPHETAMINE SULFATE 7.5; 7.5; 7.5; 7.5 MG/1; MG/1; MG/1; MG/1
30 TABLET ORAL 2 TIMES DAILY
Qty: 60 TAB | Refills: 0 | Status: SHIPPED | OUTPATIENT
Start: 2018-01-03 | End: 2018-02-02 | Stop reason: SDUPTHER

## 2017-11-03 RX ORDER — DEXTROAMPHETAMINE SACCHARATE, AMPHETAMINE ASPARTATE, DEXTROAMPHETAMINE SULFATE AND AMPHETAMINE SULFATE 7.5; 7.5; 7.5; 7.5 MG/1; MG/1; MG/1; MG/1
30 TABLET ORAL 2 TIMES DAILY
Qty: 60 TAB | Refills: 0 | Status: SHIPPED | OUTPATIENT
Start: 2017-12-03 | End: 2018-02-02 | Stop reason: SDUPTHER

## 2017-11-03 RX ORDER — PHENTERMINE HYDROCHLORIDE 37.5 MG/1
37.5 TABLET ORAL
Qty: 30 TAB | Refills: 0 | Status: SHIPPED | OUTPATIENT
Start: 2017-12-03 | End: 2018-06-05 | Stop reason: SDUPTHER

## 2017-11-03 NOTE — PROGRESS NOTES
Miller Espinal is a 42 y.o. female presents to office for medication refill      1.  Have you been to the ER, urgent care clinic or hospitalized since your last visit? no        Health Maintenance items with a due date reviewed with patient:  Health Maintenance Due   Topic Date Due    Pneumococcal 19-64 Medium Risk (1 of 1 - PPSV23) 07/31/1997    DTaP/Tdap/Td series (1 - Tdap) 07/31/1999    INFLUENZA AGE 9 TO ADULT  08/01/2017

## 2017-11-03 NOTE — PROGRESS NOTES
Marlen Mcconnell is a 42 y.o.  female and presents with acute on chronic left lateral elbow pain, ADD med refill and weight loss med retrial.     Chief Complaint   Patient presents with    Medication Refill     Subjective: Additional Concerns: none     Patient Active Problem List    Diagnosis Date Noted    Depression 05/12/2016    Insomnia 03/24/2016    Pruritus 03/24/2016    History of iron deficiency anemia 03/24/2016    ADHD (attention deficit hyperactivity disorder) 08/05/2015    Migraines 08/05/2015    Weight gain 08/05/2015    Encounter for smoking cessation counseling 08/05/2015     Current Outpatient Prescriptions   Medication Sig Dispense Refill    [START ON 1/3/2018] dextroamphetamine-amphetamine (ADDERALL) 30 mg tablet Take 1 Tab by mouth two (2) times a dayEarliest Fill Date: 1/3/18. Max Daily Amount: 2 Tabs 60 Tab 0    [START ON 12/3/2017] dextroamphetamine-amphetamine (ADDERALL) 30 mg tablet Take 1 Tab by mouth two (2) times a dayEarliest Fill Date: 12/3/17. Max Daily Amount: 2 Tabs 60 Tab 0    dextroamphetamine-amphetamine (ADDERALL) 30 mg tablet Take 1 Tab by mouth two (2) times a dayEarliest Fill Date: 11/3/17. Max Daily Amount: 2 Tabs 60 Tab 0    [START ON 12/3/2017] phentermine (ADIPEX-P) 37.5 mg tablet Take 1 Tab by mouth every morning. Max Daily Amount: 37.5 mg. 30 Tab 0    phentermine (ADIPEX-P) 37.5 mg tablet Take 1 Tab by mouth every morning. Max Daily Amount: 37.5 mg. Indications: WEIGHT LOSS MANAGEMENT FOR OBESE PATIENT (BMI >= 30) 30 Tab 0    triamcinolone acetonide (KENALOG) 0.1 % topical cream Apply  to affected area two (2) times a day. use thin layer BID x 2 weeks 30 g 1    [START ON 1/3/2018] phentermine (ADIPEX-P) 37.5 mg tablet Take 1 Tab by mouth every morning.  Max Daily Amount: 37.5 mg. 30 Tab 0    butalbital-acetaminophen-caffeine (FIORICET, ESGIC) -40 mg per tablet TAKE 1 TABLET BY MOUTH EVERY 4 HOURS AS NEEDED FOR PAIN 30 Tab 3    citalopram (CELEXA) 40 mg tablet TAKE ONE TABLET BY MOUTH DAILY 90 Tab 0    SUMAtriptan (IMITREX) 50 mg tablet Take 1 Tab by mouth once as needed for Migraine for up to 1 dose. 30 Tab 1    HYDROcodone-acetaminophen (NORCO) 5-325 mg per tablet Take 1 Tab by mouth every four (4) hours as needed for Pain. Max Daily Amount: 6 Tabs. 20 Tab 0    Multivit-Iron-Min-Folic Acid (CENTRUM ULTRA WOMEN'S) 18-0.4 mg tab Take one tab po daily 90 Tab 3    hydrOXYzine (ATARAX) 50 mg tablet Take 1 Tab by mouth three (3) times daily as needed for Itching. 90 Tab 5    traZODone (DESYREL) 100 mg tablet Take 1 Tab by mouth nightly.  30 Tab 5     Allergies   Allergen Reactions    Other Plant, Animal, Environmental Sneezing     Seasonal       Past Medical History:   Diagnosis Date    ADHD (attention deficit hyperactivity disorder)     Depression     Encounter for smoking cessation counseling     Headache      Past Surgical History:   Procedure Laterality Date    HX  SECTION      HX TUBAL LIGATION      HX WISDOM TEETH EXTRACTION       Family History   Problem Relation Age of Onset   Jagjit Torres Cancer Father      brain    Depression Mother     Attention Deficit Disorder Sister     Cancer Maternal Grandmother      Social History   Substance Use Topics    Smoking status: Current Every Day Smoker     Packs/day: 0.50     Start date: 1997    Smokeless tobacco: Never Used    Alcohol use 0.0 oz/week     0 Standard drinks or equivalent per week      Comment: occassional     ROS     General: negative for - chills, fatigue, fever, weight change  Psych: negative for - anxiety, depression, irritability or mood swings  ENT: negative for - headaches, hearing change, nasal congestion, oral lesions, sneezing or sore throat  Heme/ Lymph: negative for - bleeding problems, bruising, pallor or swollen lymph nodes  Endo: negative for - hot flashes, polydipsia/polyuria or temperature intolerance  Resp: negative for - cough, shortness of breath or wheezing  CV: negative for - chest pain, edema or palpitations  GI: negative for - abdominal pain, change in bowel habits, constipation, diarrhea or nausea/vomiting  : negative for - dysuria, hematuria, incontinence, pelvic pain or vulvar/vaginal symptoms  MSK: negative for - joint pain, joint swelling or muscle pain  Neuro: negative for - confusion, headaches, seizures or weakness  Derm: negative for - dry skin, hair changes, rash or skin lesion changes    Objective:  Vitals:    11/03/17 0750   BP: 147/88   Pulse: 98   Resp: 17   Temp: 98 °F (36.7 °C)   TempSrc: Oral   SpO2: 100%   Weight: 156 lb 3.2 oz (70.9 kg)   Height: 5' 3\" (1.6 m)   PainSc:   4   LMP: 10/31/2017     PE    Alert, well appearing, and in no distress, oriented to person, place, and time and overweight  Mental status - alert, oriented to person, place, and time, normal mood, behavior, speech, dress, motor activity, and thought processes  Chest - clear to auscultation, no wheezes, rales or rhonchi, symmetric air entry  Heart - normal rate, regular rhythm, normal S1, S2, no murmurs, rubs, clicks or gallops  Extremities - peripheral pulses normal, no pedal edema, no clubbing or cyanosis    LABS   No visits with results within 6 Month(s) from this visit.   Latest known visit with results is:    Hospital Outpatient Visit on 03/24/2016   Component Date Value Ref Range Status    WBC 03/24/2016 6.4  4.6 - 13.2 K/uL Final    RBC 03/24/2016 4.09* 4.20 - 5.30 M/uL Final    HGB 03/24/2016 12.7  12.0 - 16.0 g/dL Final    HCT 03/24/2016 38.8  35.0 - 45.0 % Final    MCV 03/24/2016 94.9  74.0 - 97.0 FL Final    MCH 03/24/2016 31.1  24.0 - 34.0 PG Final    MCHC 03/24/2016 32.7  31.0 - 37.0 g/dL Final    RDW 03/24/2016 13.8  11.6 - 14.5 % Final    PLATELET 35/31/6168 236  135 - 420 K/uL Final    MPV 03/24/2016 13.2* 9.2 - 11.8 FL Final    NEUTROPHILS 03/24/2016 67  40 - 73 % Final    LYMPHOCYTES 03/24/2016 24  21 - 52 % Final    MONOCYTES 03/24/2016 7  3 - 10 % Final    EOSINOPHILS 03/24/2016 1  0 - 5 % Final    BASOPHILS 03/24/2016 1  0 - 2 % Final    ABS. NEUTROPHILS 03/24/2016 4.3  1.8 - 8.0 K/UL Final    ABS. LYMPHOCYTES 03/24/2016 1.5  0.9 - 3.6 K/UL Final    ABS. MONOCYTES 03/24/2016 0.5  0.05 - 1.2 K/UL Final    ABS. EOSINOPHILS 03/24/2016 0.1  0.0 - 0.4 K/UL Final    ABS. BASOPHILS 03/24/2016 0.0  0.0 - 0.06 K/UL Final    DF 03/24/2016 AUTOMATED    Final       TESTS  Results for orders placed or performed during the hospital encounter of 03/24/16   CBC WITH AUTOMATED DIFF   Result Value Ref Range    WBC 6.4 4.6 - 13.2 K/uL    RBC 4.09 (L) 4.20 - 5.30 M/uL    HGB 12.7 12.0 - 16.0 g/dL    HCT 38.8 35.0 - 45.0 %    MCV 94.9 74.0 - 97.0 FL    MCH 31.1 24.0 - 34.0 PG    MCHC 32.7 31.0 - 37.0 g/dL    RDW 13.8 11.6 - 14.5 %    PLATELET 289 983 - 714 K/uL    MPV 13.2 (H) 9.2 - 11.8 FL    NEUTROPHILS 67 40 - 73 %    LYMPHOCYTES 24 21 - 52 %    MONOCYTES 7 3 - 10 %    EOSINOPHILS 1 0 - 5 %    BASOPHILS 1 0 - 2 %    ABS. NEUTROPHILS 4.3 1.8 - 8.0 K/UL    ABS. LYMPHOCYTES 1.5 0.9 - 3.6 K/UL    ABS. MONOCYTES 0.5 0.05 - 1.2 K/UL    ABS. EOSINOPHILS 0.1 0.0 - 0.4 K/UL    ABS. BASOPHILS 0.0 0.0 - 0.06 K/UL    DF AUTOMATED       Assessment/Plan:      1. Attention deficit disorder, unspecified hyperactivity presence - stable   - dextroamphetamine-amphetamine (ADDERALL) 30 mg tablet; Take 1 Tab by mouth two (2) times a dayEarliest Fill Date: 1/3/18. Max Daily Amount: 2 Tabs  Dispense: 60 Tab; Refill: 0  - dextroamphetamine-amphetamine (ADDERALL) 30 mg tablet; Take 1 Tab by mouth two (2) times a dayEarliest Fill Date: 12/3/17. Max Daily Amount: 2 Tabs  Dispense: 60 Tab; Refill: 0  - dextroamphetamine-amphetamine (ADDERALL) 30 mg tablet; Take 1 Tab by mouth two (2) times a dayEarliest Fill Date: 11/3/17. Max Daily Amount: 2 Tabs  Dispense: 60 Tab; Refill: 0    2. Weight loss   - phentermine (ADIPEX-P) 37.5 mg tablet; Take 1 Tab by mouth every morning.  Max Daily Amount: 37.5 mg.  Dispense: 30 Tab; Refill: 0  - phentermine (ADIPEX-P) 37.5 mg tablet; Take 1 Tab by mouth every morning. Max Daily Amount: 37.5 mg. Indications: WEIGHT LOSS MANAGEMENT FOR OBESE PATIENT (BMI >= 30)  Dispense: 30 Tab; Refill: 0  - phentermine (ADIPEX-P) 37.5 mg tablet; Take 1 Tab by mouth every morning. Max Daily Amount: 37.5 mg.  Dispense: 30 Tab; Refill: 0    3. Chronic right elbow epicondylitis - XR today. Plan to do injection next week if XR ok. Lab review: orders written for new lab studies as appropriate; see orders. I have discussed the diagnosis with the patient and the intended plan as seen in the above orders. The patient has received an after-visit summary and questions were answered concerning future plans. I have discussed medication side effects and warnings with the patient as well. I have reviewed the plan of care with the patient, accepted their input and they are in agreement with the treatment goals. F/U as needed. Patient will schedule elbow injection at her convenience.      Ayleen Olvera MD

## 2017-11-03 NOTE — PATIENT INSTRUCTIONS
Learning About Attention Deficit Hyperactivity Disorder (ADHD) in Adults  What is ADHD? Attention deficit hyperactivity disorder (ADHD) is a condition in which people have a hard time paying attention. Adults with ADHD also may be more active than normal. They tend to act without thinking. ADHD may make it harder for them to focus, get organized, and finish tasks. ADHD most often starts in childhood and lasts into adulthood. Many adults don't know that they have ADHD until their children are diagnosed. Then they begin to see their own symptoms. Doctors don't know what causes ADHD. But it tends to run in families. What are the symptoms? The most common types of ADHD symptoms in adults are attention problems and hyperactivity. Attention problems  Adults with ADHD often find it hard to:  · Finish tasks that don't interest them or aren't easy. But they may become obsessed with activities that they find interesting and enjoy. · Keep relationships. · Focus their attention on conversations, reading materials, or jobs. They may change jobs a lot. · Remember things. They may misplace or lose things. · Pay attention. They are easily distracted. They find it hard to focus on one task. · Think before they act. They may make quick decisions. They may act before they think about the effect of their actions. Hyperactivity  Adults with ADHD may:  · Fidget. They may swing their legs, shift in their seats, or tap their fingers. · Move around a lot. They may feel \"revved up\" or on the go. They may not be able to slow down until they are very tired. · Find it hard to relax. They may feel restless and find it hard to do quiet things like read or watch TV. How does ADHD affect daily life? ADHD in adults may affect:  · Job performance. They may find it hard to organize their work, manage their time, and focus on one task at a time. They may forget, misplace, or lose things.  They may quit their jobs out of boredom. · Relationships. Adults with ADHD may find it hard to focus their attention on conversations. It is hard for them to \"read\" the behavior and moods of others and express their own feelings. · Temper. They may get easily frustrated. This often can make it harder for them to deal with stress. These adults may overreact and have a short, quick temper. · The ability to solve problems. Adults who have a hard time waiting for things they want may act before they think about the effect of their actions. They may take part in risky behaviors. These include unprotected sex, unsafe driving, alcohol and drug use, or unwise business ventures. How is ADHD treated? ?ADHD can be treated with medicines, behavior training, or counseling. Or it may be a combination of these treatments. Medicines  ? Stimulant medicines are most often used to treat ADHD. These may include:  ? · Amphetamines (such as Adderall and Dexedrine). ? · Methylphenidate (such as Concerta, Daytrana, Focalin, Metadate, and Ritalin). ? Other medicines that may be used are:  ? · Atomoxetine, such as Strattera, a nonstimulant medicine for ADHD. ? · Antihypertensives. These include clonidine (such as Catapres) and guanfacine (such as Tenex). ? · Antidepressants, which include bupropion (Wellbutrin). ?Behavior training  ? Behavior training can help adults with ADHD learn how to:  ? · Get organized. A daily organizer or planner can help these adults organize their daily tasks. They can write down appointments and other things they need to remember. ? · Decrease distractions. They can set up their work or home environment so that there are fewer things that will distract them. They may find using headphones or a \"white noise\" machine helpful. College students can arrange a quiet living situation. They may need a single dorm room. ? · Work on relationships. Social skills training can help adults with ADHD relate to family, friends, and coworkers. Couples counseling or family therapy can also help improve relationships. ? Counseling  ? Counseling is not meant to treat inattention, hyperactivity, or impulsiveness. But it can help with some of the problems that go along with ADHD. These include not getting along well with others and having problems following rules. Where can you learn more? Go to http://chasidy-cari.info/. Enter R508 in the search box to learn more about \"Learning About Attention Deficit Hyperactivity Disorder (ADHD) in Adults. \"  Current as of: May 12, 2017  Content Version: 11.4  © 6661-7773 [employer]. Care instructions adapted under license by [employer] (which disclaims liability or warranty for this information). If you have questions about a medical condition or this instruction, always ask your healthcare professional. [place] [address] any warranty or liability for your use of this information. Tennis Elbow: Care Instructions  Your Care Instructions    Tennis elbow is soreness or pain on the outer part of the elbow. The pain occurs when the tendon is stretched and becomes irritated by repeated twisting of the hand, wrist, and forearm. A tendon is a tough tissue that connects muscle to bone. This injury is common in tennis players. But you also can get it from many activities that work the same muscles. Examples include gardening, painting, and using tools. Tennis elbow usually heals with rest and treatment at home. Follow-up care is a key part of your treatment and safety. Be sure to make and go to all appointments, and call your doctor if you are having problems. It's also a good idea to know your test results and keep a list of the medicines you take. How can you care for yourself at home? ? · Rest your fingers, wrist, and forearm. Try to stop or reduce any activity that causes elbow pain. You may have to rest your arm for weeks to months.  Follow your doctor's directions for how long to rest.   ? · Put ice or a cold pack on your elbow for 10 to 20 minutes at a time. Try to do this every 1 to 2 hours for the next 3 days (when you are awake) or until the swelling goes down. Put a thin cloth between the ice and your skin. ? · If your doctor gave you a brace or splint, use it as directed. A \"counterforce\" brace is a strap around your forearm, just below your elbow. It may ease the pressure on the tendon and spread force throughout your arm. ? · Prop up your elbow on pillows to help reduce swelling. ? · Follow your doctor's or physical therapist's directions for exercise. ? · Return to your usual activities slowly. ? · Try to prevent the problem. Learn the best techniques for your sport. For example, make sure the  on your tennis racquet is not too big for your hand. Try not to hit a tennis ball late in your swing. ? · Think about asking your employer about new ways of doing your job if your elbow pain is caused by something you do at work. Medicines  ? · Be safe with medicines. Read and follow all instructions on the label. ¨ If the doctor gave you a prescription medicine for pain, take it as prescribed. ¨ If you are not taking a prescription pain medicine, ask your doctor if you can take an over-the-counter medicine. When should you call for help? Call your doctor now or seek immediate medical care if:  ? · Your pain is worse. ? · You cannot bend your elbow normally. ? · Your arm or hand is cool or pale or changes color. ? · You have tingling, weakness, or numbness in your hand and fingers. ? Watch closely for changes in your health, and be sure to contact your doctor if:  ? · You have work problems caused by your elbow pain. ? · Your pain is not better after 2 weeks. Where can you learn more? Go to http://chasidy-cari.info/. Enter 0699 [id number] in the search box to learn more about \"Tennis Elbow: Care Instructions. \"  Current as of: March 21, 2017  Content Version: 11.4  © 8450-5028 [employer]. Care instructions adapted under license by [employer] (which disclaims liability or warranty for this information). If you have questions about a medical condition or this instruction, always ask your healthcare professional. [address] any warranty or liability for your use of this information.

## 2017-11-07 ENCOUNTER — TELEPHONE (OUTPATIENT)
Dept: FAMILY MEDICINE CLINIC | Age: 37
End: 2017-11-07

## 2017-11-07 NOTE — LETTER
11/21/2017 2:18 PM 
 
Ms. Fernando Fenton [address] [facility] [address] [id number] Dear Ms. Willem Bojorquez: We have been trying to reach you by telephone. Please call our office at [phone] to speak with one of the nurses. Thank you. Sincerely, Lito Jackson MD

## 2017-11-07 NOTE — TELEPHONE ENCOUNTER
----- Message from Sanjuana Dotson MD sent at 11/5/2017  4:31 PM EST -----  Pls report normal elbow XR and remind her to sched a 30 min appointment if she wants a lateral elbow pain injection at her convenience.

## 2018-02-02 ENCOUNTER — OFFICE VISIT (OUTPATIENT)
Dept: FAMILY MEDICINE CLINIC | Age: 38
End: 2018-02-02

## 2018-02-02 VITALS
TEMPERATURE: 97.3 F | SYSTOLIC BLOOD PRESSURE: 134 MMHG | OXYGEN SATURATION: 98 % | BODY MASS INDEX: 28.35 KG/M2 | HEIGHT: 63 IN | DIASTOLIC BLOOD PRESSURE: 83 MMHG | WEIGHT: 160 LBS | RESPIRATION RATE: 18 BRPM | HEART RATE: 96 BPM

## 2018-02-02 DIAGNOSIS — G43.009 NONINTRACTABLE MIGRAINE, UNSPECIFIED MIGRAINE TYPE: ICD-10-CM

## 2018-02-02 DIAGNOSIS — F32.89 OTHER DEPRESSION: Primary | ICD-10-CM

## 2018-02-02 DIAGNOSIS — L29.9 PRURITUS: ICD-10-CM

## 2018-02-02 DIAGNOSIS — F98.8 ATTENTION DEFICIT DISORDER, UNSPECIFIED HYPERACTIVITY PRESENCE: ICD-10-CM

## 2018-02-02 DIAGNOSIS — G43.009 MIGRAINE WITHOUT AURA AND WITHOUT STATUS MIGRAINOSUS, NOT INTRACTABLE: ICD-10-CM

## 2018-02-02 RX ORDER — DEXTROAMPHETAMINE SACCHARATE, AMPHETAMINE ASPARTATE, DEXTROAMPHETAMINE SULFATE AND AMPHETAMINE SULFATE 7.5; 7.5; 7.5; 7.5 MG/1; MG/1; MG/1; MG/1
30 TABLET ORAL 2 TIMES DAILY
Qty: 60 TAB | Refills: 0 | Status: SHIPPED | OUTPATIENT
Start: 2018-03-02 | End: 2018-05-03 | Stop reason: SDUPTHER

## 2018-02-02 RX ORDER — TRIAMCINOLONE ACETONIDE 1 MG/G
CREAM TOPICAL 2 TIMES DAILY
Qty: 30 G | Refills: 1 | Status: SHIPPED | OUTPATIENT
Start: 2018-02-02

## 2018-02-02 RX ORDER — BUTALBITAL, ACETAMINOPHEN AND CAFFEINE 50; 325; 40 MG/1; MG/1; MG/1
TABLET ORAL
Qty: 30 TAB | Refills: 3 | Status: SHIPPED | OUTPATIENT
Start: 2018-02-02 | End: 2018-06-05 | Stop reason: SDUPTHER

## 2018-02-02 RX ORDER — CITALOPRAM 40 MG/1
40 TABLET, FILM COATED ORAL DAILY
Qty: 90 TAB | Refills: 1 | Status: SHIPPED | OUTPATIENT
Start: 2018-02-02 | End: 2018-06-05 | Stop reason: SDUPTHER

## 2018-02-02 RX ORDER — SUMATRIPTAN 50 MG/1
50 TABLET, FILM COATED ORAL
Qty: 30 TAB | Refills: 1 | Status: SHIPPED | OUTPATIENT
Start: 2018-02-02 | End: 2018-06-05 | Stop reason: SDUPTHER

## 2018-02-02 RX ORDER — DEXTROAMPHETAMINE SACCHARATE, AMPHETAMINE ASPARTATE, DEXTROAMPHETAMINE SULFATE AND AMPHETAMINE SULFATE 7.5; 7.5; 7.5; 7.5 MG/1; MG/1; MG/1; MG/1
30 TABLET ORAL 2 TIMES DAILY
Qty: 60 TAB | Refills: 0 | Status: SHIPPED | OUTPATIENT
Start: 2018-02-02 | End: 2018-05-03 | Stop reason: SDUPTHER

## 2018-02-02 NOTE — PATIENT INSTRUCTIONS
Attention Deficit Hyperactivity Disorder (ADHD) in Adults: Care Instructions  Your Care Instructions    Attention deficit hyperactivity disorder, or ADHD, is a condition that makes it hard to pay attention. So you may have problems when you try to focus, get organized, and finish tasks. It might make you more active than other people. Or you might do things without thinking first.  ADHD is very common. It usually starts in early childhood. Many adults don't realize they have it until their children are diagnosed. Then they become aware of their own symptoms. Doctors don't know what causes ADHD. But it often runs in families. ADHD can be treated with medicines, behavior training, and counseling. Treatment can improve your life. Follow-up care is a key part of your treatment and safety. Be sure to make and go to all appointments, and call your doctor if you are having problems. It's also a good idea to know your test results and keep a list of the medicines you take. How can you care for yourself at home? · Learn all you can about ADHD. This will help you and your family understand it better. · Take your medicines exactly as prescribed. Call your doctor if you think you are having a problem with your medicine. You will get more details on the specific medicines your doctor prescribes. · If you miss a dose of your medicine, do not take an extra dose. · If your doctor suggests counseling, find a counselor you like and trust. Talk openly and honestly. Be willing to make some changes. · Find a support group for adults with ADHD. Talking to others with the same problems can help you feel better. It can also give you ideas about how to best cope with the condition. · Get rid of distractions at your work space. Keep your desk clean. Try not to face a window or busy hallway. · Use files, planners, and other tools to keep you organized. · Limit use of alcohol, and do not use illegal drugs.  People with ADHD tend to become addicted more easily than others. Tell your doctor if you need help to quit. Counseling, support groups, and sometimes medicines can help you stay free of alcohol or drugs. · Get at least 30 minutes of physical activity on most days of the week. Exercise has been shown to help people cope with ADHD. Walking is a good choice. You also may want to do other activities, such as running, swimming, cycling, or playing tennis or team sports. When should you call for help? Watch closely for changes in your health, and be sure to contact your doctor if:  ? · You feel sad a lot or cry all the time. ? · You have trouble sleeping, or you sleep too much. ? · You find it hard to concentrate, make decisions, or remember things. ? · You change how you normally eat. ? · You feel guilty for no reason. Where can you learn more? Go to http://chasidy-cari.info/. Enter B196 in the search box to learn more about \"Attention Deficit Hyperactivity Disorder (ADHD) in Adults: Care Instructions. \"  Current as of: May 12, 2017  Content Version: 11.4  © 0302-4149 [employer]. Care instructions adapted under license by [employer] (which disclaims liability or warranty for this information). If you have questions about a medical condition or this instruction, always ask your healthcare professional. [address] any warranty or liability for your use of this information.

## 2018-02-02 NOTE — PROGRESS NOTES
Dreama Nyhan is a 42 y.o.  female and presents with F/U for ADD, migraines and pruritus med refill. Chief Complaint   Patient presents with    Medication Refill     Subjective: Additional Concerns: none     Patient Active Problem List    Diagnosis Date Noted    Depression 05/12/2016    Insomnia 03/24/2016    Pruritus 03/24/2016    History of iron deficiency anemia 03/24/2016    ADHD (attention deficit hyperactivity disorder) 08/05/2015    Migraines 08/05/2015    Weight gain 08/05/2015    Encounter for smoking cessation counseling 08/05/2015     Current Outpatient Prescriptions   Medication Sig Dispense Refill    [START ON 3/2/2018] dextroamphetamine-amphetamine (ADDERALL) 30 mg tablet Take 1 Tab by mouth two (2) times a dayEarliest Fill Date: 3/2/18. Max Daily Amount: 2 Tabs 60 Tab 0    [START ON 3/2/2018] dextroamphetamine-amphetamine (ADDERALL) 30 mg tablet Take 1 Tab by mouth two (2) times a dayEarliest Fill Date: 3/2/18. Max Daily Amount: 2 Tabs 60 Tab 0    dextroamphetamine-amphetamine (ADDERALL) 30 mg tablet Take 1 Tab by mouth two (2) times a dayEarliest Fill Date: 2/2/18. Max Daily Amount: 2 Tabs 60 Tab 0    butalbital-acetaminophen-caffeine (FIORICET, ESGIC) -40 mg per tablet TAKE 1 TABLET BY MOUTH EVERY 4 HOURS AS NEEDED FOR PAIN 30 Tab 3    citalopram (CELEXA) 40 mg tablet Take 1 Tab by mouth daily. 90 Tab 1    SUMAtriptan (IMITREX) 50 mg tablet Take 1 Tab by mouth once as needed for Migraine for up to 1 dose. 30 Tab 1    triamcinolone acetonide (KENALOG) 0.1 % topical cream Apply  to affected area two (2) times a day. use thin layer BID x 2 weeks 30 g 1    HYDROcodone-acetaminophen (NORCO) 5-325 mg per tablet Take 1 Tab by mouth every four (4) hours as needed for Pain. Max Daily Amount: 6 Tabs.  20 Tab 0    Multivit-Iron-Min-Folic Acid (CENTRUM ULTRA WOMEN'S) 18-0.4 mg tab Take one tab po daily 90 Tab 3    phentermine (ADIPEX-P) 37.5 mg tablet Take 1 Tab by mouth every morning. Max Daily Amount: 37.5 mg. 30 Tab 0    phentermine (ADIPEX-P) 37.5 mg tablet Take 1 Tab by mouth every morning. Max Daily Amount: 37.5 mg. Indications: WEIGHT LOSS MANAGEMENT FOR OBESE PATIENT (BMI >= 30) 30 Tab 0    phentermine (ADIPEX-P) 37.5 mg tablet Take 1 Tab by mouth every morning. Max Daily Amount: 37.5 mg. 30 Tab 0    hydrOXYzine (ATARAX) 50 mg tablet Take 1 Tab by mouth three (3) times daily as needed for Itching. 90 Tab 5    traZODone (DESYREL) 100 mg tablet Take 1 Tab by mouth nightly.  30 Tab 5     Allergies   Allergen Reactions    Other Plant, Animal, Environmental Sneezing     Seasonal       Past Medical History:   Diagnosis Date    ADHD (attention deficit hyperactivity disorder)     Depression     Encounter for smoking cessation counseling     Headache      Past Surgical History:   Procedure Laterality Date    HX  SECTION      HX TUBAL LIGATION      HX WISDOM TEETH EXTRACTION       Family History   Problem Relation Age of Onset   Velma Killian Cancer Father      brain    Depression Mother     Attention Deficit Disorder Sister     Cancer Maternal Grandmother      Social History   Substance Use Topics    Smoking status: Current Every Day Smoker     Packs/day: 0.50     Start date: 1997    Smokeless tobacco: Never Used    Alcohol use 0.0 oz/week     0 Standard drinks or equivalent per week      Comment: occassional     ROS     General: negative for - chills, fatigue, fever, weight change  Psych: negative for - anxiety, depression, irritability or mood swings, positive for attention deficit   Resp: negative for - cough, shortness of breath or wheezing  CV: negative for - chest pain, edema or palpitations  MSK: negative for - joint pain, joint swelling or muscle pain  Neuro: negative for - confusion, headaches, seizures or weakness    Objective:  Vitals:    18 0944   BP: 134/83   Pulse: 96   Resp: 18   Temp: 97.3 °F (36.3 °C)   TempSrc: Oral   SpO2: 98%   Weight: 160 lb (72.6 kg)   Height: 5' 3\" (1.6 m)   PainSc:   0 - No pain     PE    Alert, well appearing, and in no distress, oriented to person, place, and time and overweight  General appearance - alert, well appearing, and in no distress and oriented to person, place, and time  Mental status - alert, oriented to person, place, and time, normal mood, behavior, speech, dress, motor activity, and thought processes  Chest - clear to auscultation, no wheezes, rales or rhonchi, symmetric air entry  Heart - normal rate, regular rhythm, normal S1, S2, no murmurs, rubs, clicks or gallops  Extremities - peripheral pulses normal, no pedal edema, no clubbing or cyanosis    LABS   No visits with results within 6 Month(s) from this visit. Latest known visit with results is:    Hospital Outpatient Visit on 03/24/2016   Component Date Value Ref Range Status    WBC 03/24/2016 6.4  4.6 - 13.2 K/uL Final    RBC 03/24/2016 4.09* 4.20 - 5.30 M/uL Final    HGB 03/24/2016 12.7  12.0 - 16.0 g/dL Final    HCT 03/24/2016 38.8  35.0 - 45.0 % Final    MCV 03/24/2016 94.9  74.0 - 97.0 FL Final    MCH 03/24/2016 31.1  24.0 - 34.0 PG Final    MCHC 03/24/2016 32.7  31.0 - 37.0 g/dL Final    RDW 03/24/2016 13.8  11.6 - 14.5 % Final    PLATELET 21/68/1709 927  135 - 420 K/uL Final    MPV 03/24/2016 13.2* 9.2 - 11.8 FL Final    NEUTROPHILS 03/24/2016 67  40 - 73 % Final    LYMPHOCYTES 03/24/2016 24  21 - 52 % Final    MONOCYTES 03/24/2016 7  3 - 10 % Final    EOSINOPHILS 03/24/2016 1  0 - 5 % Final    BASOPHILS 03/24/2016 1  0 - 2 % Final    ABS. NEUTROPHILS 03/24/2016 4.3  1.8 - 8.0 K/UL Final    ABS. LYMPHOCYTES 03/24/2016 1.5  0.9 - 3.6 K/UL Final    ABS. MONOCYTES 03/24/2016 0.5  0.05 - 1.2 K/UL Final    ABS. EOSINOPHILS 03/24/2016 0.1  0.0 - 0.4 K/UL Final    ABS.  BASOPHILS 03/24/2016 0.0  0.0 - 0.06 K/UL Final    DF 03/24/2016 AUTOMATED    Final       TESTS  Results for orders placed or performed during the hospital encounter of 03/24/16   CBC WITH AUTOMATED DIFF   Result Value Ref Range    WBC 6.4 4.6 - 13.2 K/uL    RBC 4.09 (L) 4.20 - 5.30 M/uL    HGB 12.7 12.0 - 16.0 g/dL    HCT 38.8 35.0 - 45.0 %    MCV 94.9 74.0 - 97.0 FL    MCH 31.1 24.0 - 34.0 PG    MCHC 32.7 31.0 - 37.0 g/dL    RDW 13.8 11.6 - 14.5 %    PLATELET 937 840 - 836 K/uL    MPV 13.2 (H) 9.2 - 11.8 FL    NEUTROPHILS 67 40 - 73 %    LYMPHOCYTES 24 21 - 52 %    MONOCYTES 7 3 - 10 %    EOSINOPHILS 1 0 - 5 %    BASOPHILS 1 0 - 2 %    ABS. NEUTROPHILS 4.3 1.8 - 8.0 K/UL    ABS. LYMPHOCYTES 1.5 0.9 - 3.6 K/UL    ABS. MONOCYTES 0.5 0.05 - 1.2 K/UL    ABS. EOSINOPHILS 0.1 0.0 - 0.4 K/UL    ABS. BASOPHILS 0.0 0.0 - 0.06 K/UL    DF AUTOMATED       Assessment/Plan:      1. Attention deficit disorder, unspecified hyperactivity presence - stable   - dextroamphetamine-amphetamine (ADDERALL) 30 mg tablet; Take 1 Tab by mouth two (2) times a dayEarliest Fill Date: 3/2/18. Max Daily Amount: 2 Tabs  Dispense: 60 Tab; Refill: 0  - dextroamphetamine-amphetamine (ADDERALL) 30 mg tablet; Take 1 Tab by mouth two (2) times a dayEarliest Fill Date: 3/2/18. Max Daily Amount: 2 Tabs  Dispense: 60 Tab; Refill: 0  - dextroamphetamine-amphetamine (ADDERALL) 30 mg tablet; Take 1 Tab by mouth two (2) times a dayEarliest Fill Date: 2/2/18. Max Daily Amount: 2 Tabs  Dispense: 60 Tab; Refill: 0    2. Nonintractable migraine, unspecified migraine type  - butalbital-acetaminophen-caffeine (FIORICET, ESGIC) -40 mg per tablet; TAKE 1 TABLET BY MOUTH EVERY 4 HOURS AS NEEDED FOR PAIN  Dispense: 30 Tab; Refill: 3    3. Migraine without aura and without status migrainosus, not intractable  - SUMAtriptan (IMITREX) 50 mg tablet; Take 1 Tab by mouth once as needed for Migraine for up to 1 dose. Dispense: 30 Tab; Refill: 1    4. Pruritus  - triamcinolone acetonide (KENALOG) 0.1 % topical cream; Apply  to affected area two (2) times a day.  use thin layer BID x 2 weeks  Dispense: 30 g; Refill: 1    Lab review: orders written for new lab studies as appropriate; see orders. I have discussed the diagnosis with the patient and the intended plan as seen in the above orders. The patient has received an after-visit summary and questions were answered concerning future plans. I have discussed medication side effects and warnings with the patient as well. I have reviewed the plan of care with the patient, accepted their input and they are in agreement with the treatment goals. Follow-up Disposition:  Return in about 3 months (around 5/2/2018), or if symptoms worsen or fail to improve.     Deandra Cornelius MD

## 2018-02-02 NOTE — PROGRESS NOTES
Amado Murray is a 42 y.o. female presents to office for medication refill      1.  Have you been to the ER, urgent care clinic or hospitalized since your last visit? no          Health Maintenance items with a due date reviewed with patient:  Health Maintenance Due   Topic Date Due    Pneumococcal 19-64 Medium Risk (1 of 1 - PPSV23) 07/31/1997    DTaP/Tdap/Td series (1 - Tdap) 07/31/1999    Influenza Age 9 to Adult  08/01/2017

## 2018-05-03 ENCOUNTER — OFFICE VISIT (OUTPATIENT)
Dept: FAMILY MEDICINE CLINIC | Age: 38
End: 2018-05-03

## 2018-05-03 ENCOUNTER — HOSPITAL ENCOUNTER (OUTPATIENT)
Dept: LAB | Age: 38
Discharge: HOME | End: 2018-05-03
Payer: MEDICAID

## 2018-05-03 VITALS
RESPIRATION RATE: 17 BRPM | WEIGHT: 173 LBS | TEMPERATURE: 97.8 F | HEIGHT: 63 IN | OXYGEN SATURATION: 96 % | BODY MASS INDEX: 30.65 KG/M2 | SYSTOLIC BLOOD PRESSURE: 109 MMHG | HEART RATE: 90 BPM | DIASTOLIC BLOOD PRESSURE: 67 MMHG

## 2018-05-03 DIAGNOSIS — F90.1 ATTENTION DEFICIT HYPERACTIVITY DISORDER (ADHD), PREDOMINANTLY HYPERACTIVE TYPE: ICD-10-CM

## 2018-05-03 DIAGNOSIS — F90.1 ATTENTION DEFICIT HYPERACTIVITY DISORDER (ADHD), PREDOMINANTLY HYPERACTIVE TYPE: Primary | ICD-10-CM

## 2018-05-03 DIAGNOSIS — K04.7 TOOTH ABSCESS: ICD-10-CM

## 2018-05-03 PROCEDURE — 80307 DRUG TEST PRSMV CHEM ANLYZR: CPT | Performed by: PHYSICIAN ASSISTANT

## 2018-05-03 RX ORDER — DEXTROAMPHETAMINE SACCHARATE, AMPHETAMINE ASPARTATE, DEXTROAMPHETAMINE SULFATE AND AMPHETAMINE SULFATE 7.5; 7.5; 7.5; 7.5 MG/1; MG/1; MG/1; MG/1
30 TABLET ORAL 2 TIMES DAILY
Qty: 60 TAB | Refills: 0 | Status: SHIPPED | OUTPATIENT
Start: 2018-05-03 | End: 2018-06-05 | Stop reason: SDUPTHER

## 2018-05-03 RX ORDER — AMOXICILLIN AND CLAVULANATE POTASSIUM 500; 125 MG/1; MG/1
1 TABLET, FILM COATED ORAL 2 TIMES DAILY
Qty: 20 TAB | Refills: 0 | Status: SHIPPED | OUTPATIENT
Start: 2018-05-03 | End: 2018-05-13

## 2018-05-03 NOTE — MR AVS SNAPSHOT
[address] [address] 
[phone] Patient: Jennifer Muñiz MRN: [id number] MOR:[id number] Visit Information Date & Time Provider Department Dept. Phone Encounter #  
 5/3/2018 10:00 AM Maine Hernandez, [address] [facility] [phone] [id number] Upcoming Health Maintenance Date Due Pneumococcal 19-64 Medium Risk (1 of 1 - PPSV23) 7/31/1997 DTaP/Tdap/Td series (1 - Tdap) 7/31/1999 MEDICARE YEARLY EXAM 3/14/2018 Influenza Age 5 to Adult 8/1/2018 PAP AKA CERVICAL CYTOLOGY 11/23/2018 Allergies as of 5/3/2018  Review Complete On: 5/3/2018 By: YONATAN Krause Severity Noted Reaction Type Reactions Other Plant, Animal, Environmental  08/05/2015    Sneezing Seasonal  
  
Current Immunizations  Never Reviewed No immunizations on file. Not reviewed this visit You Were Diagnosed With   
  
 Codes Comments Attention deficit hyperactivity disorder (ADHD), predominantly hyperactive type    -  Primary ICD-10-CM: F90.1 ICD-9-CM: 314.01 Tooth abscess     ICD-10-CM: K04.7 ICD-9-CM: 522.5 Vitals BP Pulse Temp Resp Height(growth percentile) Weight(growth percentile) 109/67 (BP 1 Location: Right arm, BP Patient Position: Sitting) 90 97.8 °F (36.6 °C) (Oral) 17 5' 3\" (1.6 m) 173 lb (78.5 kg) LMP SpO2 BMI OB Status Smoking Status 04/24/2018 96% 30.65 kg/m2 Having regular periods Current Every Day Smoker Vitals History BMI and BSA Data Body Mass Index Body Surface Area  
 30.65 kg/m 2 1.87 m 2 Preferred Pharmacy Pharmacy Name Phone [place], [address] [facility] [address] [facility] [phone] Your Updated Medication List  
  
   
This list is accurate as of 5/3/18 10:51 AM.  Always use your most recent med list.  
  
  
  
  
 amoxicillin-clavulanate 500-125 mg per tablet Commonly known as:  AUGMENTIN Take 1 Tab by mouth two (2) times a day for 10 days. butalbital-acetaminophen-caffeine -40 mg per tablet Commonly known as:  FIORICET, ESGIC  
TAKE 1 TABLET BY MOUTH EVERY 4 HOURS AS NEEDED FOR PAIN  
  
 citalopram 40 mg tablet Commonly known as:  Mina Copa Take 1 Tab by mouth daily. dextroamphetamine-amphetamine 30 mg tablet Commonly known as:  ADDERALL Take 1 Tab by mouth two (2) times a day. Max Daily Amount: 2 Tabs HYDROcodone-acetaminophen 5-325 mg per tablet Commonly known as:  Bessy Boot Take 1 Tab by mouth every four (4) hours as needed for Pain. Max Daily Amount: 6 Tabs. hydrOXYzine HCl 50 mg tablet Commonly known as:  ATARAX Take 1 Tab by mouth three (3) times daily as needed for Itching. Multivit-Iron-Min-Folic Acid 65-0.6 mg Tab Commonly known as:  CENTRUM ULTRA WOMEN'S Take one tab po daily  
  
 phentermine 37.5 mg tablet Commonly known as:  ADIPEX-P Take 1 Tab by mouth every morning. Max Daily Amount: 37.5 mg.  
  
 SUMAtriptan 50 mg tablet Commonly known as:  IMITREX Take 1 Tab by mouth once as needed for Migraine for up to 1 dose. traZODone 100 mg tablet Commonly known as:  Ingrid Ryder Take 1 Tab by mouth nightly. triamcinolone acetonide 0.1 % topical cream  
Commonly known as:  KENALOG Apply  to affected area two (2) times a day. use thin layer BID x 2 weeks Prescriptions Printed Refills  
 dextroamphetamine-amphetamine (ADDERALL) 30 mg tablet 0 Sig: Take 1 Tab by mouth two (2) times a day. Max Daily Amount: 2 Tabs Class: Print Route: Oral  
  
Prescriptions Sent to Pharmacy Refills  
 amoxicillin-clavulanate (AUGMENTIN) 500-125 mg per tablet 0 Sig: Take 1 Tab by mouth two (2) times a day for 10 days.   
 Class: Normal  
 Pharmacy: [facility] [address] [place] [address], [address] [facility] #: [phone] Route: Oral  
  
To-Do List   
 05/03/2018 Lab:  11-DRUG SCREEN, URINE Introducing [facility]! [facility] introduces [employer] patient portal. Now you can access parts of your medical record, email your doctor's office, and request medication refills online. 1. In your internet browser, go to [email or web address] 2. Click on the First Time User? Click Here link in the Sign In box. You will see the New Member Sign Up page. 3. Enter your BorderJump Access Code exactly as it appears below. You will not need to use this code after youve completed the sign-up process. If you do not sign up before the expiration date, you must request a new code. · BorderJump Access Code: [email or web address] Expires: 8/1/2018 10:51 AM 
 
4. Enter the last four digits of your Social Security Number (xxxx) and Date of Birth (mm/dd/yyyy) as indicated and click Submit. You will be taken to the next sign-up page. 5. Create a BorderJump ID. This will be your BorderJump login ID and cannot be changed, so think of one that is secure and easy to remember. 6. Create a BorderJump password. You can change your password at any time. 7. Enter your Password Reset Question and Answer. This can be used at a later time if you forget your password. 8. Enter your e-mail address. You will receive e-mail notification when new information is available in [address]. 9. Click Sign Up. You can now view and download portions of your medical record. 10. Click the Download Summary menu link to download a portable copy of your medical information. If you have questions, please visit the Frequently Asked Questions section of the BorderJump website. Remember, BorderJump is NOT to be used for urgent needs. For medical emergencies, dial 911. Now available from your iPhone and Android! Please provide this summary of care documentation to your next provider. Your primary care clinician is listed as Elio Fletcher. If you have any questions after today's visit, please call [phone].

## 2018-05-03 NOTE — PROGRESS NOTES
Patient is here for medication refill. Patient also complains about an abscess in roof of mouth. 1. Have you been to the ER, urgent care clinic since your last visit? Hospitalized since your last visit?no    2. Have you seen or consulted any other health care providers outside of the [address] [place] [address] since your last visit? Include any pap smears or colon screening.  no.

## 2018-05-04 LAB
AMPHETAMINES UR QL SCN: NEGATIVE NG/ML
BARBITURATES UR QL SCN: NEGATIVE NG/ML
BENZODIAZ UR QL SCN: NEGATIVE NG/ML
BUPRENORPHINE UR QL: NEGATIVE NG/ML
BZE UR QL SCN: NEGATIVE NG/ML
CANNABINOIDS UR QL SCN: NEGATIVE NG/ML
CREAT UR-MCNC: 145.5 MG/DL (ref 20–300)
METHADONE UR QL SCN: NEGATIVE NG/ML
OPIATES UR QL SCN: NEGATIVE NG/ML
OXYCODONE+OXYMORPHONE UR QL SCN: NEGATIVE NG/ML
PCP UR QL: NEGATIVE NG/ML
PH UR: 6.3 [PH] (ref 4.5–8.9)
PLEASE NOTE:, 733163: NORMAL
PROPOXYPH UR QL SCN: NEGATIVE NG/ML

## 2018-05-04 NOTE — PROGRESS NOTES
HISTORY OF PRESENT ILLNESS  Estela Banks is a 42 y.o. female presenting for folllow up of ADHD and has a right upper toothache and possible abscess. Medication Refill   The history is provided by the patient. This is a chronic problem. The problem occurs daily. Progression since onset: nanaged with medication. Pertinent negatives include no chest pain. Dental Problem    The history is provided by the patient. This is a recurrent problem. Review of Systems   Constitutional: Negative for chills and fever. HENT: Positive for dental problem. Upper right tooth ache   Cardiovascular: Negative for chest pain and palpitations. Physical Exam   Constitutional: She is oriented to person, place, and time. She appears well-developed and well-nourished. No distress. Eyes: Pupils are equal, round, and reactive to light. Lymphadenopathy:     She has no cervical adenopathy. Neurological: She is alert and oriented to person, place, and time. ASSESSMENT and PLAN    ICD-10-CM ICD-9-CM    1. Attention deficit hyperactivity disorder (ADHD), predominantly hyperactive type F90.1 314.01 dextroamphetamine-amphetamine (ADDERALL) 30 mg tablet      11-DRUG SCREEN, URINE   2.  Tooth abscess K04.7 522.5 amoxicillin-clavulanate (AUGMENTIN) 500-125 mg per tablet

## 2018-06-05 ENCOUNTER — HOSPITAL ENCOUNTER (OUTPATIENT)
Dept: LAB | Age: 38
Discharge: HOME | End: 2018-06-05
Payer: MEDICAID

## 2018-06-05 ENCOUNTER — OFFICE VISIT (OUTPATIENT)
Dept: FAMILY MEDICINE CLINIC | Age: 38
End: 2018-06-05

## 2018-06-05 VITALS
WEIGHT: 169 LBS | SYSTOLIC BLOOD PRESSURE: 135 MMHG | TEMPERATURE: 99.5 F | RESPIRATION RATE: 17 BRPM | OXYGEN SATURATION: 96 % | BODY MASS INDEX: 29.95 KG/M2 | HEIGHT: 63 IN | DIASTOLIC BLOOD PRESSURE: 95 MMHG | HEART RATE: 76 BPM

## 2018-06-05 DIAGNOSIS — G43.009 MIGRAINE WITHOUT AURA AND WITHOUT STATUS MIGRAINOSUS, NOT INTRACTABLE: ICD-10-CM

## 2018-06-05 DIAGNOSIS — F90.1 ATTENTION DEFICIT HYPERACTIVITY DISORDER (ADHD), PREDOMINANTLY HYPERACTIVE TYPE: ICD-10-CM

## 2018-06-05 DIAGNOSIS — L30.9 DERMATITIS: ICD-10-CM

## 2018-06-05 DIAGNOSIS — F32.89 OTHER DEPRESSION: ICD-10-CM

## 2018-06-05 DIAGNOSIS — B02.9 HERPES ZOSTER WITHOUT COMPLICATION: Primary | ICD-10-CM

## 2018-06-05 DIAGNOSIS — R63.4 WEIGHT LOSS: ICD-10-CM

## 2018-06-05 DIAGNOSIS — Z72.0 SMOKING TRYING TO QUIT: ICD-10-CM

## 2018-06-05 DIAGNOSIS — G43.009 NONINTRACTABLE MIGRAINE, UNSPECIFIED MIGRAINE TYPE: ICD-10-CM

## 2018-06-05 PROCEDURE — 80324 DRUG SCREEN AMPHETAMINES 1/2: CPT | Performed by: FAMILY MEDICINE

## 2018-06-05 PROCEDURE — 80307 DRUG TEST PRSMV CHEM ANLYZR: CPT | Performed by: FAMILY MEDICINE

## 2018-06-05 RX ORDER — PHENTERMINE HYDROCHLORIDE 37.5 MG/1
37.5 TABLET ORAL
Qty: 30 TAB | Refills: 0 | Status: SHIPPED | OUTPATIENT
Start: 2018-06-05 | End: 2018-07-06 | Stop reason: SDUPTHER

## 2018-06-05 RX ORDER — CITALOPRAM 40 MG/1
40 TABLET, FILM COATED ORAL DAILY
Qty: 90 TAB | Refills: 1 | Status: SHIPPED | OUTPATIENT
Start: 2018-06-05

## 2018-06-05 RX ORDER — VARENICLINE TARTRATE 25 MG
KIT ORAL
Qty: 1 DOSE PACK | Refills: 0 | Status: SHIPPED | OUTPATIENT
Start: 2018-06-05

## 2018-06-05 RX ORDER — BUTALBITAL, ACETAMINOPHEN AND CAFFEINE 50; 325; 40 MG/1; MG/1; MG/1
TABLET ORAL
Qty: 30 TAB | Refills: 3 | Status: SHIPPED | OUTPATIENT
Start: 2018-06-05

## 2018-06-05 RX ORDER — DEXTROAMPHETAMINE SACCHARATE, AMPHETAMINE ASPARTATE, DEXTROAMPHETAMINE SULFATE AND AMPHETAMINE SULFATE 7.5; 7.5; 7.5; 7.5 MG/1; MG/1; MG/1; MG/1
30 TABLET ORAL 2 TIMES DAILY
Qty: 60 TAB | Refills: 0 | Status: SHIPPED | OUTPATIENT
Start: 2018-06-05 | End: 2018-07-06 | Stop reason: SDUPTHER

## 2018-06-05 RX ORDER — VALACYCLOVIR HYDROCHLORIDE 1 G/1
1000 TABLET, FILM COATED ORAL 3 TIMES DAILY
Qty: 21 TAB | Refills: 0 | Status: SHIPPED | OUTPATIENT
Start: 2018-06-05

## 2018-06-05 RX ORDER — SUMATRIPTAN 50 MG/1
50 TABLET, FILM COATED ORAL
Qty: 30 TAB | Refills: 1 | Status: SHIPPED | OUTPATIENT
Start: 2018-06-05

## 2018-06-05 RX ORDER — TRIAMCINOLONE ACETONIDE 1 MG/G
CREAM TOPICAL 2 TIMES DAILY
Qty: 15 G | Refills: 2 | Status: SHIPPED | OUTPATIENT
Start: 2018-06-05

## 2018-06-05 RX ORDER — VARENICLINE TARTRATE 1 MG/1
1 TABLET, FILM COATED ORAL 2 TIMES DAILY
Qty: 60 TAB | Refills: 1 | Status: SHIPPED | OUTPATIENT
Start: 2018-06-05

## 2018-06-05 NOTE — MR AVS SNAPSHOT
[address] [place] [address] [address] [address] 
[phone] Patient: Ingrid Sousa MRN: [id number] LV Visit Information Date & Time Provider Department Dept. Phone Encounter #  
 2018 10:45 AM Allyson Nava MD [employer] [phone] [id number] Follow-up Instructions Return in about 3 months (around 2018), or if symptoms worsen or fail to improve. Upcoming Health Maintenance Date Due Pneumococcal 19-64 Medium Risk (1 of 1 - PPSV23) 1997 DTaP/Tdap/Td series (1 - Tdap) 1999 MEDICARE YEARLY EXAM 3/14/2018 Influenza Age 5 to Adult 2018 PAP AKA CERVICAL CYTOLOGY 2018 Allergies as of 2018  Review Complete On: 5/3/2018 By: YONATAN Malcolm Severity Noted Reaction Type Reactions Other Plant, Animal, Environmental  2015    Sneezing Seasonal  
  
Current Immunizations  Never Reviewed No immunizations on file. Not reviewed this visit You Were Diagnosed With   
  
 Codes Comments Herpes zoster without complication    -  Primary ICD-10-CM: B02.9 ICD-9-CM: 395. 9 Attention deficit hyperactivity disorder (ADHD), predominantly hyperactive type     ICD-10-CM: F90.1 ICD-9-CM: 314.01 Other depression     ICD-10-CM: F32.89 ICD-9-CM: 780 Weight loss     ICD-10-CM: R63.4 ICD-9-CM: 783.21 Nonintractable migraine, unspecified migraine type     ICD-10-CM: G43.009 ICD-9-CM: 346.10 Migraine without aura and without status migrainosus, not intractable     ICD-10-CM: V36.246 ICD-9-CM: 346.10 Vitals BP Pulse Temp Resp Height(growth percentile) Weight(growth percentile) (!) 135/95 76 99.5 °F (37.5 °C) (Oral) 17 5' 3\" (1.6 m) 169 lb (76.7 kg) SpO2 BMI OB Status Smoking Status 96% 29.94 kg/m2 Having regular periods Current Every Day Smoker BMI and BSA Data Body Mass Index Body Surface Area  
 29.94 kg/m 2 1.85 m 2 Preferred Pharmacy Pharmacy Name Phone [facility] [address] [phone] Your Updated Medication List  
  
   
This list is accurate as of 6/5/18 11:14 AM.  Always use your most recent med list.  
  
  
  
  
 butalbital-acetaminophen-caffeine -40 mg per tablet Commonly known as:  FIORICET, ESGIC  
TAKE 1 TABLET BY MOUTH EVERY 4 HOURS AS NEEDED FOR PAIN  
  
 citalopram 40 mg tablet Commonly known as:  Cookie Dusky Take 1 Tab by mouth daily. dextroamphetamine-amphetamine 30 mg tablet Commonly known as:  ADDERALL Take 1 Tab by mouth two (2) times a dayEarliest Fill Date: 6/5/18. Max Daily Amount: 2 Tabs Multivit-Iron-Min-Folic Acid 45-4.3 mg Tab Commonly known as:  CENTRUM ULTRA WOMEN'S Take one tab po daily  
  
 phentermine 37.5 mg tablet Commonly known as:  ADIPEX-P Take 1 Tab by mouth every morning. Max Daily Amount: 37.5 mg.  
  
 SUMAtriptan 50 mg tablet Commonly known as:  IMITREX Take 1 Tab by mouth once as needed for Migraine for up to 1 dose. triamcinolone acetonide 0.1 % topical cream  
Commonly known as:  KENALOG Apply  to affected area two (2) times a day. use thin layer BID x 2 weeks  
  
 valACYclovir 1 gram tablet Commonly known as:  VALTREX Take 1 Tab by mouth three (3) times daily. Indications: herpes zoster Prescriptions Printed Refills  
 dextroamphetamine-amphetamine (ADDERALL) 30 mg tablet 0 Sig: Take 1 Tab by mouth two (2) times a dayEarliest Fill Date: 6/5/18. Max Daily Amount: 2 Tabs Class: Print Route: Oral  
 phentermine (ADIPEX-P) 37.5 mg tablet 0 Sig: Take 1 Tab by mouth every morning. Max Daily Amount: 37.5 mg.  
 Class: Print Route: Oral  
  
Prescriptions Sent to Pharmacy  Refills  
 citalopram (CELEXA) 40 mg tablet 1  
 Sig: Take 1 Tab by mouth daily. Class: Normal  
 Pharmacy: [address] [place] [address], [address] [place] [address] Ph #: [phone] Route: Oral  
 butalbital-acetaminophen-caffeine (FIORICET, ESGIC) -40 mg per tablet 3 Sig: TAKE 1 TABLET BY MOUTH EVERY 4 HOURS AS NEEDED FOR PAIN Class: Normal  
 Pharmacy: [address] [place] [address], [address] [place] [address] Ph #: [phone] SUMAtriptan (IMITREX) 50 mg tablet 1 Sig: Take 1 Tab by mouth once as needed for Migraine for up to 1 dose. Class: Normal  
 Pharmacy: [address] [place] [address], [address] [place] [address] Ph #: [phone] Route: Oral  
 valACYclovir (VALTREX) 1 gram tablet 0 Sig: Take 1 Tab by mouth three (3) times daily. Indications: herpes zoster Class: Normal  
 Pharmacy: [address] [place] [address], [address] [place] [address] Ph #: [phone] Route: Oral  
  
Follow-up Instructions Return in about 3 months (around 9/5/2018), or if symptoms worsen or fail to improve. To-Do List   
 06/05/2018 Lab:  DRUG SCREEN UR - W/ CONFIRM Patient Instructions Attention Deficit Hyperactivity Disorder (ADHD) in Adults: Care Instructions Your Care Instructions Attention deficit hyperactivity disorder, or ADHD, is a condition that makes it hard to pay attention. So you may have problems when you try to focus, get organized, and finish tasks. It might make you more active than other people. Or you might do things without thinking first. 
ADHD is very common. It usually starts in early childhood. Many adults don't realize they have it until their children are diagnosed. Then they become aware of their own symptoms. Doctors don't know what causes ADHD. But it often runs in families. ADHD can be treated with medicines, behavior training, and counseling. Treatment can improve your life. Follow-up care is a key part of your treatment and safety. Be sure to make and go to all appointments, and call your doctor if you are having problems. It's also a good idea to know your test results and keep a list of the medicines you take. How can you care for yourself at home? · Learn all you can about ADHD. This will help you and your family understand it better. · Take your medicines exactly as prescribed. Call your doctor if you think you are having a problem with your medicine. You will get more details on the specific medicines your doctor prescribes. · If you miss a dose of your medicine, do not take an extra dose. · If your doctor suggests counseling, find a counselor you like and trust. Talk openly and honestly. Be willing to make some changes. · Find a support group for adults with ADHD. Talking to others with the same problems can help you feel better. It can also give you ideas about how to best cope with the condition. · Get rid of distractions at your work space. Keep your desk clean. Try not to face a window or busy hallway. · Use files, planners, and other tools to keep you organized. · Limit use of alcohol, and do not use illegal drugs. People with ADHD tend to become addicted more easily than others. Tell your doctor if you need help to quit. Counseling, support groups, and sometimes medicines can help you stay free of alcohol or drugs. · Get at least 30 minutes of physical activity on most days of the week. Exercise has been shown to help people cope with ADHD. Walking is a good choice. You also may want to do other activities, such as running, swimming, cycling, or playing tennis or team sports. When should you call for help? Watch closely for changes in your health, and be sure to contact your doctor if: 
? · You feel sad a lot or cry all the time. ? · You have trouble sleeping, or you sleep too much. ? · You find it hard to concentrate, make decisions, or remember things. ? · You change how you normally eat. ? · You feel guilty for no reason. Where can you learn more? Go to http://chasidy-cari.info/. Enter B196 in the search box to learn more about \"Attention Deficit Hyperactivity Disorder (ADHD) in Adults: Care Instructions. \" Current as of: May 12, 2017 Content Version: 11.4 © 1917-5448 [employer]. Care instructions adapted under license by [employer] (which disclaims liability or warranty for this information). If you have questions about a medical condition or this instruction, always ask your healthcare professional. [address] any warranty or liability for your use of this information. Introducing \[facility]\"" & HEALTH SERVICES! Isaiah Olivo introduces OpenZine patient portal. Now you can access parts of your medical record, email your doctor's office, and request medication refills online. 1. In your internet browser, go to [email or web address] 2. Click on the First Time User? Click Here link in the Sign In box. You will see the New Member Sign Up page. 3. Enter your OpenZine Access Code exactly as it appears below. You will not need to use this code after youve completed the sign-up process. If you do not sign up before the expiration date, you must request a new code. · OpenZine Access Code: [email or web address] Expires: 8/1/2018 10:51 AM 
 
4. Enter the last four digits of your Social Security Number (xxxx) and Date of Birth (mm/dd/yyyy) as indicated and click Submit. You will be taken to the next sign-up page. 5. Create a OpenZine ID. This will be your OpenZine login ID and cannot be changed, so think of one that is secure and easy to remember. 6. Create a [employer] password. You can change your password at any time. 7. Enter your Password Reset Question and Answer. This can be used at a later time if you forget your password. 8. Enter your e-mail address. You will receive e-mail notification when new information is available in [address] 9. Click Sign Up. You can now view and download portions of your medical record. 10. Click the Download Summary menu link to download a portable copy of your medical information. If you have questions, please visit the Frequently Asked Questions section of the [employer] website. Remember, CellSpin is NOT to be used for urgent needs. For medical emergencies, dial 911. Now available from your iPhone and Android! Please provide this summary of care documentation to your next provider. Your primary care clinician is listed as Elio Fletcher. If you have any questions after today's visit, please call [phone].

## 2018-06-05 NOTE — PROGRESS NOTES
Christine Ruvalcaba is a 64[address] [facility] y.o. female presents to office for medication refill and blisters on back        Health Maintenance items with a due date reviewed with patient:  Health Maintenance Due   Topic Date Due    Pneumococcal 19-64 Medium Risk (1 of 1 - PPSV23) 07/31/1997    DTaP/Tdap/Td series (1 - Tdap) 07/31/1999    MEDICARE YEARLY EXAM  03/14/2018

## 2018-06-05 NOTE — PROGRESS NOTES
Cathie Bridges is a 42 y.o.  female and presents with F/U for ADD, depression, weight loss, migraines, low back skin blisters that are burning in pain,   Smoking cessation and a separate dermatitis on both distal arms. Chief Complaint   Patient presents with    Medication Refill    Skin Problem     Subjective: Additional Concerns: none      Patient Active Problem List    Diagnosis Date Noted    Depression 05/12/2016    Insomnia 03/24/2016    Pruritus 03/24/2016    History of iron deficiency anemia 03/24/2016    ADHD (attention deficit hyperactivity disorder) 08/05/2015    Migraines 08/05/2015    Weight gain 08/05/2015    Encounter for smoking cessation counseling 08/05/2015     Current Outpatient Prescriptions   Medication Sig Dispense Refill    dextroamphetamine-amphetamine (ADDERALL) 30 mg tablet Take 1 Tab by mouth two (2) times a dayEarliest Fill Date: 6/5/18. Max Daily Amount: 2 Tabs 60 Tab 0    citalopram (CELEXA) 40 mg tablet Take 1 Tab by mouth daily. 90 Tab 1    phentermine (ADIPEX-P) 37.5 mg tablet Take 1 Tab by mouth every morning. Max Daily Amount: 37.5 mg. 30 Tab 0    butalbital-acetaminophen-caffeine (FIORICET, ESGIC) -40 mg per tablet TAKE 1 TABLET BY MOUTH EVERY 4 HOURS AS NEEDED FOR PAIN 30 Tab 3    SUMAtriptan (IMITREX) 50 mg tablet Take 1 Tab by mouth once as needed for Migraine for up to 1 dose. 30 Tab 1    valACYclovir (VALTREX) 1 gram tablet Take 1 Tab by mouth three (3) times daily. Indications: herpes zoster 21 Tab 0    varenicline (CHANTIX STARTER NABILA) 0.5 mg (11)- 1 mg (42) DsPk Use as directed starting pack 1 Dose Pack 0    varenicline (CHANTIX CONTINUING MONTH BOX) 1 mg tablet Take 1 Tab by mouth two (2) times a day. 60 Tab 1    triamcinolone acetonide (KENALOG) 0.1 % topical cream Apply  to affected area two (2) times a day. use thin layer BID no more than 1-2 weeks at a time.  15 g 2    triamcinolone acetonide (KENALOG) 0.1 % topical cream Apply  to affected area two (2) times a day.  use thin layer BID x 2 weeks 30 g 1    Multivit-Iron-Min-Folic Acid (CENTRUM ULTRA WOMEN'S) 18-0.4 mg tab Take one tab po daily 90 Tab 3     Allergies   Allergen Reactions    Other Plant, Animal, Environmental Sneezing     Seasonal       Past Medical History:   Diagnosis Date    ADHD (attention deficit hyperactivity disorder)     Depression     Encounter for smoking cessation counseling     Headache      Past Surgical History:   Procedure Laterality Date    HX  SECTION      HX TUBAL LIGATION      HX WISDOM TEETH EXTRACTION       Family History   Problem Relation Age of Onset   Kristopher Pleitez Cancer Father      brain    Depression Mother     Attention Deficit Disorder Sister     Cancer Maternal Grandmother      Social History   Substance Use Topics    Smoking status: Current Every Day Smoker     Packs/day: 0.50     Start date: 1997    Smokeless tobacco: Never Used    Alcohol use 0.0 oz/week     0 Standard drinks or equivalent per week      Comment: occassional     ROS     General: negative for - chills, fatigue, fever, positive weight gain change  Psych: negative for - anxiety, positive for depression, no irritability or mood swings  Endo: negative for - hot flashes, polydipsia/polyuria or temperature intolerance  Resp: negative for - cough, shortness of breath or wheezing  CV: negative for - chest pain, edema or palpitations  MSK: negative for - joint pain, joint swelling or muscle pain  Neuro: negative for - confusion, positive for headaches, no seizures or weakness, burning sensation low back     Objective:  Vitals:    18 1054   BP: (!) 135/95   Pulse: 76   Resp: 17   Temp: 99.5 °F (37.5 °C)   TempSrc: Oral   SpO2: 96%   Weight: 169 lb (76.7 kg)   Height: 5' 3\" (1.6 m)     PE    alert, well appearing, and in no distress, oriented to person, place, and time and overweight  General appearance - alert, well appearing, and in no distress, oriented to person, place, and time and overweight  Mental status - alert, oriented to person, place, and time, normal mood, behavior, speech, dress, motor activity, and thought processes  Chest - clear to auscultation, no wheezes, rales or rhonchi, symmetric air entry  Heart - normal rate, regular rhythm, normal S1, S2, no murmurs, rubs, clicks or gallops  Extremities - peripheral pulses normal, no pedal edema, no clubbing or cyanosis    [address] [facility] Outpatient Visit on 05/03/2018   Component Date Value Ref Range Status    Amphetamine Screen, urine 05/03/2018 NEGATIVE   Rnvlvc=9636 ng/mL Final    Barbiturates Screen, urine 05/03/2018 NEGATIVE   Rzrvjx=989 ng/mL Final    Benzodiazepines Screen, urine 05/03/2018 NEGATIVE   Dsotpy=713 ng/mL Final    Cannabinoid Screen, urine 05/03/2018 NEGATIVE   Cutoff=20 ng/mL Final    Cocaine Metab. Screen, urine 05/03/2018 NEGATIVE   Tgiyvr=961 ng/mL Final    Opiate Screen, urine 05/03/2018 NEGATIVE   Uewfbl=516 ng/mL Final    Comment: (NOTE)  Opiate test includes Codeine, Morphine, Hydromorphone, Hydrocodone.  Oxycodone/Oxymorphone, urine 05/03/2018 NEGATIVE   Hfwmah=292 ng/mL Final    Comment: (NOTE)  Test includes Oxycodone and Oxymorphone      Phencyclidine Screen, urine 05/03/2018 NEGATIVE   Cutoff=25 ng/mL Final    Methadone Screen, urine 05/03/2018 NEGATIVE   Hchrul=038 ng/mL Final    Propoxyphene Screen, urine 05/03/2018 NEGATIVE   Ngdelo=999 ng/mL Final    Buprenorphine, urine 05/03/2018 NEGATIVE   Cutoff=10 ng/mL Final    Creatinine, urine 05/03/2018 145.5  20.0 - 300.0 mg/dL Final    pH, urine 05/03/2018 6.3  4.5 - 8.9   Final    Please note: 05/03/2018 Comment    Final    Comment: (NOTE)  This assay provides a preliminary unconfirmed analytical test  result that may be suitable for clinical management of patients  in certain situations. Drug-test results should be interpreted  in the context of clinical information.  Patient metabolic  variables, specific drug chemistry, and specimen  characteristics can affect test outcome. Technical consultation  is available if a test result is inconsistent with an expected  outcome. ([email or web address] or call toll-free  [phone])  Performed At: [facility]  [address] [place], [place] [id number]  Nohemi Day MD WV:[id number]         TESTS  Results for orders placed or performed during the hospital encounter of 05/03/18   11-DRUG SCREEN, URINE   Result Value Ref Range    Amphetamine Screen, urine NEGATIVE  Tfqfvq=3724 ng/mL    Barbiturates Screen, urine NEGATIVE  Dirkdo=024 ng/mL    Benzodiazepines Screen, urine NEGATIVE  Tyukee=459 ng/mL    Cannabinoid Screen, urine NEGATIVE  Cutoff=20 ng/mL    Cocaine Metab. Screen, urine NEGATIVE  Cnkgty=273 ng/mL    Opiate Screen, urine NEGATIVE  Ycilmk=737 ng/mL    Oxycodone/Oxymorphone, urine NEGATIVE  Ngrvnn=349 ng/mL    Phencyclidine Screen, urine NEGATIVE  Cutoff=25 ng/mL    Methadone Screen, urine NEGATIVE  Gnfzxx=415 ng/mL    Propoxyphene Screen, urine NEGATIVE  Bxmobg=236 ng/mL    Buprenorphine, urine NEGATIVE  Cutoff=10 ng/mL    Creatinine, urine 145.5 20.0 - 300.0 mg/dL    pH, urine 6.3 4.5 - 8.9      Please note: Comment       Assessment/Plan:      1. Attention deficit hyperactivity disorder (ADHD), predominantly hyperactive type  - dextroamphetamine-amphetamine (ADDERALL) 30 mg tablet; Take 1 Tab by mouth two (2) times a dayEarliest Fill Date: 6/5/18. Max Daily Amount: 2 Tabs  Dispense: 60 Tab; Refill: 0  - DRUG SCREEN UR - W/ CONFIRM ([facility] Only); Future  - DRUG SCREEN UR - W/ CONFIRM ([facility] Only)    2. Other depression  - citalopram (CELEXA) 40 mg tablet; Take 1 Tab by mouth daily. Dispense: 90 Tab; Refill: 1    3. Weight loss  - phentermine (ADIPEX-P) 37.5 mg tablet; Take 1 Tab by mouth every morning. Max Daily Amount: 37.5 mg.  Dispense: 30 Tab; Refill: 0  - DRUG SCREEN UR - W/ CONFIRM ([facility] Only);  Future  - DRUG SCREEN UR - W/ CONFIRM ([facility] Only)    4. Nonintractable migraine, unspecified migraine type  - butalbital-acetaminophen-caffeine (FIORICET, ESGIC) -40 mg per tablet; TAKE 1 TABLET BY MOUTH EVERY 4 HOURS AS NEEDED FOR PAIN  Dispense: 30 Tab; Refill: 3    5. Migraine without aura and without status migrainosus, not intractable  - SUMAtriptan (IMITREX) 50 mg tablet; Take 1 Tab by mouth once as needed for Migraine for up to 1 dose. Dispense: 30 Tab; Refill: 1    6. Herpes zoster without complication  - valACYclovir (VALTREX) 1 gram tablet; Take 1 Tab by mouth three (3) times daily. Indications: herpes zoster  Dispense: 21 Tab; Refill: 0    7. Smoking trying to quit  - varenicline (CHANTIX STARTER NABILA) 0.5 mg (11)- 1 mg (42) DsPk; Use as directed starting pack  Dispense: 1 Dose Pack; Refill: 0  - varenicline (CHANTIX CONTINUING MONTH BOX) 1 mg tablet; Take 1 Tab by mouth two (2) times a day. Dispense: 60 Tab; Refill: 1    8. Dermatitis  - triamcinolone acetonide (KENALOG) 0.1 % topical cream; Apply  to affected area two (2) times a day. use thin layer BID no more than 1-2 weeks at a time. Dispense: 15 g; Refill: 2    Lab review: orders written for new lab studies as appropriate; see orders    I have discussed the diagnosis with the patient and the intended plan as seen in the above orders. The patient has received an after-visit summary and questions were answered concerning future plans. I have discussed medication side effects and warnings with the patient as well. I have reviewed the plan of care with the patient, accepted their input and they are in agreement with the treatment goals. Follow-up Disposition:  Return in about 3 months (around 9/5/2018), or if symptoms worsen or fail to improve.     Sandi Falcon MD

## 2018-06-11 LAB
AMPHET CTO UR CFM-MCNC: 2760 NG/ML
AMPHET UR QL SCN: POSITIVE
AMPHET+METHAMPHET UR QL: POSITIVE
AMPHETAMINES UR QL: POSITIVE
BARBITURATES UR QL SCN: NEGATIVE
BENZODIAZ UR QL: NEGATIVE
CANNABINOIDS UR QL SCN: NEGATIVE
COCAINE UR QL SCN: NEGATIVE
HDSCOM,HDSCOM: ABNORMAL
METHADONE UR QL: NEGATIVE
METHAMPHET UR QL: NEGATIVE
OPIATES UR QL: NEGATIVE
PCP UR QL: NEGATIVE

## 2018-07-06 ENCOUNTER — OFFICE VISIT (OUTPATIENT)
Dept: FAMILY MEDICINE CLINIC | Age: 38
End: 2018-07-06

## 2018-07-06 VITALS
WEIGHT: 165 LBS | HEART RATE: 93 BPM | HEIGHT: 63 IN | TEMPERATURE: 98.5 F | OXYGEN SATURATION: 100 % | SYSTOLIC BLOOD PRESSURE: 120 MMHG | RESPIRATION RATE: 17 BRPM | BODY MASS INDEX: 29.23 KG/M2 | DIASTOLIC BLOOD PRESSURE: 79 MMHG

## 2018-07-06 DIAGNOSIS — R63.4 WEIGHT LOSS: ICD-10-CM

## 2018-07-06 DIAGNOSIS — F90.1 ATTENTION DEFICIT HYPERACTIVITY DISORDER (ADHD), PREDOMINANTLY HYPERACTIVE TYPE: ICD-10-CM

## 2018-07-06 RX ORDER — DEXTROAMPHETAMINE SACCHARATE, AMPHETAMINE ASPARTATE, DEXTROAMPHETAMINE SULFATE AND AMPHETAMINE SULFATE 7.5; 7.5; 7.5; 7.5 MG/1; MG/1; MG/1; MG/1
30 TABLET ORAL 2 TIMES DAILY
Qty: 60 TAB | Refills: 0 | Status: SHIPPED | OUTPATIENT
Start: 2018-08-06 | End: 2018-09-04 | Stop reason: SDUPTHER

## 2018-07-06 RX ORDER — PHENTERMINE HYDROCHLORIDE 37.5 MG/1
37.5 TABLET ORAL
Qty: 30 TAB | Refills: 0 | Status: SHIPPED | OUTPATIENT
Start: 2018-08-06

## 2018-07-06 RX ORDER — PHENTERMINE HYDROCHLORIDE 37.5 MG/1
37.5 TABLET ORAL
Qty: 30 TAB | Refills: 0 | Status: SHIPPED | OUTPATIENT
Start: 2018-07-06 | End: 2018-09-04 | Stop reason: SDUPTHER

## 2018-07-06 RX ORDER — DEXTROAMPHETAMINE SACCHARATE, AMPHETAMINE ASPARTATE, DEXTROAMPHETAMINE SULFATE AND AMPHETAMINE SULFATE 7.5; 7.5; 7.5; 7.5 MG/1; MG/1; MG/1; MG/1
30 TABLET ORAL 2 TIMES DAILY
Qty: 60 TAB | Refills: 0 | Status: SHIPPED | OUTPATIENT
Start: 2018-07-06 | End: 2018-09-04 | Stop reason: SDUPTHER

## 2018-07-06 RX ORDER — DEXTROAMPHETAMINE SACCHARATE, AMPHETAMINE ASPARTATE, DEXTROAMPHETAMINE SULFATE AND AMPHETAMINE SULFATE 7.5; 7.5; 7.5; 7.5 MG/1; MG/1; MG/1; MG/1
30 TABLET ORAL 2 TIMES DAILY
Qty: 60 TAB | Refills: 0 | Status: SHIPPED | OUTPATIENT
Start: 2018-09-06 | End: 2018-09-04 | Stop reason: SDUPTHER

## 2018-07-06 NOTE — PROGRESS NOTES
Rosalba Gaspar is a 42 y.o. female presents to office for medication refill and skin problem    Medication list has been reviewed with Rosalba Gaspar and updated as of today's date     Health Maintenance items with a due date reviewed with patient:  Health Maintenance Due   Topic Date Due    Pneumococcal 19-64 Medium Risk (1 of 1 - PPSV23) 07/31/1997    DTaP/Tdap/Td series (1 - Tdap) 07/31/1999    MEDICARE YEARLY EXAM  03/14/2018

## 2018-07-06 NOTE — MR AVS SNAPSHOT
[address] [facility] 
 
 
 [address] [facility] [address] [facility] [address] 
[phone] Patient: Ciaran Winchester MRN: [id number] [facility]:[birth year] Visit Information Date & Time Provider Department Dept. Phone Encounter #  
 7/6/2018 10:30 AM Gisel Sewell MD [facility] [place] [phone] [id number] Upcoming Health Maintenance Date Due Pneumococcal 19-64 Medium Risk (1 of 1 - PPSV23) 7/31/1997 DTaP/Tdap/Td series (1 - Tdap) 7/31/1999 MEDICARE YEARLY EXAM 3/14/2018 Influenza Age 5 to Adult 8/1/2018 PAP AKA CERVICAL CYTOLOGY 11/23/2018 Allergies as of 7/6/2018  Review Complete On: 5/3/2018 By: YONATAN Cronin Severity Noted Reaction Type Reactions Other Plant, Animal, Environmental  08/05/2015    Sneezing Seasonal  
  
Current Immunizations  Never Reviewed No immunizations on file. Not reviewed this visit You Were Diagnosed With   
  
 Codes Comments Attention deficit hyperactivity disorder (ADHD), predominantly hyperactive type     ICD-10-CM: F90.1 ICD-9-CM: 314.01 Weight loss     ICD-10-CM: R63.4 ICD-9-CM: 783.21 Vitals BP Pulse Temp Resp Height(growth percentile) Weight(growth percentile) 120/79 93 98.5 °F (36.9 °C) (Oral) 17 5' 3\" (1.6 m) 165 lb (74.8 kg) LMP SpO2 BMI OB Status Smoking Status 06/21/2018 100% 29.23 kg/m2 Having regular periods Current Every Day Smoker BMI and BSA Data Body Mass Index Body Surface Area  
 29.23 kg/m 2 1.82 m 2 Preferred Pharmacy Pharmacy Name Phone [place], [address] [facility] [phone] Your Updated Medication List  
  
   
This list is accurate as of 7/6/18 10:51 AM.  Always use your most recent med list.  
  
  
  
  
 butalbital-acetaminophen-caffeine -40 mg per tablet Commonly known as:  Jayce Babin  
 TAKE 1 TABLET BY MOUTH EVERY 4 HOURS AS NEEDED FOR PAIN  
  
 citalopram 40 mg tablet Commonly known as:  Shira Narrow Take 1 Tab by mouth daily. * dextroamphetamine-amphetamine 30 mg tablet Commonly known as:  ADDERALL Take 1 Tab by mouth two (2) times a dayEarliest Fill Date: 7/6/18. Max Daily Amount: 2 Tabs * dextroamphetamine-amphetamine 30 mg tablet Commonly known as:  ADDERALL Take 1 Tab by mouth two (2) times a dayEarliest Fill Date: 8/6/18. Max Daily Amount: 2 Tabs Start taking on:  8/6/2018 * dextroamphetamine-amphetamine 30 mg tablet Commonly known as:  ADDERALL Take 1 Tab by mouth two (2) times a dayEarliest Fill Date: 9/6/18. Max Daily Amount: 2 Tabs Start taking on:  9/6/2018 Multivit-Iron-Min-Folic Acid 62-0.6 mg Tab Commonly known as:  CENTRUM ULTRA WOMEN'S Take one tab po daily * phentermine 37.5 mg tablet Commonly known as:  ADIPEX-P Take 1 Tab by mouth every morning. Max Daily Amount: 37.5 mg.  
  
 * phentermine 37.5 mg tablet Commonly known as:  ADIPEX-P Take 1 Tab by mouth every morning. Max Daily Amount: 37.5 mg.  
Start taking on:  8/6/2018 SUMAtriptan 50 mg tablet Commonly known as:  IMITREX Take 1 Tab by mouth once as needed for Migraine for up to 1 dose. * triamcinolone acetonide 0.1 % topical cream  
Commonly known as:  KENALOG Apply  to affected area two (2) times a day. use thin layer BID x 2 weeks * triamcinolone acetonide 0.1 % topical cream  
Commonly known as:  KENALOG Apply  to affected area two (2) times a day. use thin layer BID no more than 1-2 weeks at a time. valACYclovir 1 gram tablet Commonly known as:  VALTREX Take 1 Tab by mouth three (3) times daily. Indications: herpes zoster * varenicline 0.5 mg (11)- 1 mg (42) Dspk Commonly known as:  CHANTIX STARTER NABILA Use as directed starting pack * varenicline 1 mg tablet Commonly known as:  [address] [facility] Take 1 Tab by mouth two (2) times a day. * Notice: This list has 9 medication(s) that are the same as other medications prescribed for you. Read the directions carefully, and ask your doctor or other care provider to review them with you. Prescriptions Printed Refills  
 dextroamphetamine-amphetamine (ADDERALL) 30 mg tablet 0 Sig: Take 1 Tab by mouth two (2) times a dayEarliest Fill Date: 7/6/18. Max Daily Amount: 2 Tabs Class: Print Route: Oral  
 phentermine (ADIPEX-P) 37.5 mg tablet 0 Sig: Take 1 Tab by mouth every morning. Max Daily Amount: 37.5 mg.  
 Class: Print Route: Oral  
 phentermine (ADIPEX-P) 37.5 mg tablet 0 Starting on: 8/6/2018 Sig: Take 1 Tab by mouth every morning. Max Daily Amount: 37.5 mg.  
 Class: Print Route: Oral  
 dextroamphetamine-amphetamine (ADDERALL) 30 mg tablet 0 Starting on: 8/6/2018 Sig: Take 1 Tab by mouth two (2) times a dayEarliest Fill Date: 8/6/18. Max Daily Amount: 2 Tabs Class: Print Route: Oral  
 dextroamphetamine-amphetamine (ADDERALL) 30 mg tablet 0 Starting on: 9/6/2018 Sig: Take 1 Tab by mouth two (2) times a dayEarliest Fill Date: 9/6/18. Max Daily Amount: 2 Tabs Class: Print Route: Oral  
  
Introducing [facility]! [facility] introduces Education Elements patient portal. Now you can access parts of your medical record, email your doctor's office, and request medication refills online. 1. In your internet browser, go to [email or web address] 2. Click on the First Time User? Click Here link in the Sign In box. You will see the New Member Sign Up page. 3. Enter your Education Elements Access Code exactly as it appears below. You will not need to use this code after youve completed the sign-up process. If you do not sign up before the expiration date, you must request a new code. · Education Elements Access Code: [email or web address] Expires: 8/1/2018 10:51 AM 
 
 4. Enter the last four digits of your Social Security Number (xxxx) and Date of Birth (mm/dd/yyyy) as indicated and click Submit. You will be taken to the next sign-up page. 5. Create a Ajungo ID. This will be your Ajungo login ID and cannot be changed, so think of one that is secure and easy to remember. 6. Create a Ajungo password. You can change your password at any time. 7. Enter your Password Reset Question and Answer. This can be used at a later time if you forget your password. 8. Enter your e-mail address. You will receive e-mail notification when new information is available in [address] 9. Click Sign Up. You can now view and download portions of your medical record. 10. Click the Download Summary menu link to download a portable copy of your medical information. If you have questions, please visit the Frequently Asked Questions section of the Ajungo website. Remember, Ajungo is NOT to be used for urgent needs. For medical emergencies, dial 911. Now available from your iPhone and Android! Please provide this summary of care documentation to your next provider. Your primary care clinician is listed as Elio Fletcher. If you have any questions after today's visit, please call [phone].

## 2018-07-06 NOTE — PROGRESS NOTES
Marlen Mcconnell is a 42 y.o.  female and presents with F/U for ADD and weight loss management. Patient denies any current complaint and no   Significant side effects to date. Chief Complaint   Patient presents with    Medication Refill    Skin Problem     Subjective: Additional Concerns: none    Patient Active Problem List    Diagnosis Date Noted    Depression 05/12/2016    Insomnia 03/24/2016    Pruritus 03/24/2016    History of iron deficiency anemia 03/24/2016    ADHD (attention deficit hyperactivity disorder) 08/05/2015    Migraines 08/05/2015    Weight gain 08/05/2015    Encounter for smoking cessation counseling 08/05/2015     Current Outpatient Prescriptions   Medication Sig Dispense Refill    dextroamphetamine-amphetamine (ADDERALL) 30 mg tablet Take 1 Tab by mouth two (2) times a dayEarliest Fill Date: 7/6/18. Max Daily Amount: 2 Tabs 60 Tab 0    phentermine (ADIPEX-P) 37.5 mg tablet Take 1 Tab by mouth every morning. Max Daily Amount: 37.5 mg. 30 Tab 0    [START ON 8/6/2018] phentermine (ADIPEX-P) 37.5 mg tablet Take 1 Tab by mouth every morning. Max Daily Amount: 37.5 mg. 30 Tab 0    [START ON 8/6/2018] dextroamphetamine-amphetamine (ADDERALL) 30 mg tablet Take 1 Tab by mouth two (2) times a dayEarliest Fill Date: 8/6/18. Max Daily Amount: 2 Tabs 60 Tab 0    [START ON 9/6/2018] dextroamphetamine-amphetamine (ADDERALL) 30 mg tablet Take 1 Tab by mouth two (2) times a dayEarliest Fill Date: 9/6/18. Max Daily Amount: 2 Tabs 60 Tab 0    citalopram (CELEXA) 40 mg tablet Take 1 Tab by mouth daily. 90 Tab 1    SUMAtriptan (IMITREX) 50 mg tablet Take 1 Tab by mouth once as needed for Migraine for up to 1 dose. 30 Tab 1    varenicline (CHANTIX CONTINUING MONTH BOX) 1 mg tablet Take 1 Tab by mouth two (2) times a day.  60 Tab 1    Multivit-Iron-Min-Folic Acid (CENTRUM ULTRA WOMEN'S) 18-0.4 mg tab Take one tab po daily 90 Tab 3    butalbital-acetaminophen-caffeine (FIORICET, ESGIC) -40 mg per tablet TAKE 1 TABLET BY MOUTH EVERY 4 HOURS AS NEEDED FOR PAIN 30 Tab 3    valACYclovir (VALTREX) 1 gram tablet Take 1 Tab by mouth three (3) times daily. Indications: herpes zoster 21 Tab 0    varenicline (CHANTIX STARTER NABILA) 0.5 mg (11)- 1 mg (42) DsPk Use as directed starting pack 1 Dose Pack 0    triamcinolone acetonide (KENALOG) 0.1 % topical cream Apply  to affected area two (2) times a day. use thin layer BID no more than 1-2 weeks at a time. 15 g 2    triamcinolone acetonide (KENALOG) 0.1 % topical cream Apply  to affected area two (2) times a day.  use thin layer BID x 2 weeks 30 g 1     Allergies   Allergen Reactions    Other Plant, Animal, Environmental Sneezing     Seasonal       Past Medical History:   Diagnosis Date    ADHD (attention deficit hyperactivity disorder)     Depression     Encounter for smoking cessation counseling     Headache      Past Surgical History:   Procedure Laterality Date    HX  SECTION      HX TUBAL LIGATION      HX WISDOM TEETH EXTRACTION       Family History   Problem Relation Age of Onset   Jeff Burns Cancer Father      brain    Depression Mother     Attention Deficit Disorder Sister     Cancer Maternal Grandmother      Social History   Substance Use Topics    Smoking status: Current Every Day Smoker     Packs/day: 0.50     Start date: 1997    Smokeless tobacco: Never Used    Alcohol use 0.0 oz/week     0 Standard drinks or equivalent per week      Comment: occassional     ROS     General: negative for - chills, fatigue, fever, positive for weight gain change  Psych: negative for - anxiety, depression, irritability or mood swings, positive for attention deficit   Resp: negative for - cough, shortness of breath or wheezing  CV: negative for - chest pain, edema or palpitations  Neuro: negative for - confusion, headaches, seizures or weakness    Objective:  Vitals:    18 1042   BP: 120/79   Pulse: 93   Resp: 17   Temp: 98.5 °F (36.9 °C)   TempSrc: Oral   SpO2: 100%   Weight: 165 lb (74.8 kg)   Height: 5' 3\" (1.6 m)   PainSc:   0 - No pain   LMP: 06/21/2018     PE    Alert, well appearing, and in no distress, oriented to person, place, and time and overweight  General appearance - alert, well appearing, and in no distress, oriented to person, place, and time and overweight  Mental status - alert, oriented to person, place, and time, normal mood, behavior, speech, dress, motor activity, and thought processes  Chest - clear to auscultation, no wheezes, rales or rhonchi, symmetric air entry  Heart - normal rate, regular rhythm, normal S1, S2, no murmurs, rubs, clicks or gallops  Extremities - peripheral pulses normal, no pedal edema, no clubbing or cyanosis    [facility]   Component Date Value Ref Range Status    BENZODIAZEPINES 06/05/2018 NEGATIVE   NEG   Final    BARBITURATES 06/05/2018 NEGATIVE   NEG   Final    THC (TH-CANNABINOL) 06/05/2018 NEGATIVE   NEG   Final    OPIATES 06/05/2018 NEGATIVE   NEG   Final    PCP(PHENCYCLIDINE) 06/05/2018 NEGATIVE   NEG   Final    COCAINE 06/05/2018 NEGATIVE   NEG   Final    AMPHETAMINES 06/05/2018 POSITIVE* NEG   Final    INITIAL SCREEN POSITIVE,CONFIRMATION TO FOLLOW    METHADONE 06/05/2018 NEGATIVE   NEG   Final    HDSCOM 06/05/2018 (NOTE)   Final    Comment: Specimen analysis was performed without chain of custody handling. These results should be used for medical purposes only and not for   legal or employment purposes. Unconfirmed screening results must not   be used for non-medical purposes. The cut-off concentration for positive results are as follows:    AMPH     1000 ng/mL  TEGAN      200 ng/mL  VAZQUEZ      200 ng/mL  ANDREA       300 ng/mL  METH      300 ng/mL  OPI       300 ng/mL  PCP        25 ng/mL  THC        50 ng/mL        Amphetamines 06/05/2018 Positive*   Final    Comment: (NOTE)  Amphetamine test includes Amphetamine and Methamphetamine.       Amphetamine 06/05/2018 Positive*   Final    Amphetamine confirm. 06/05/2018 2760  Opqieh=075 ng/mL Final    Methamphetamine 06/05/2018 NEGATIVE   Ymswhr=357   Final    Comment: (NOTE)  Performed At:  [facility]  [address] [place], [place] [id number]  Barber Salas MD AT:[id number]     Hospital Outpatient Visit on 05/03/2018   Component Date Value Ref Range Status    Amphetamine Screen, urine 05/03/2018 NEGATIVE   Nultcl=0446 ng/mL Final    Barbiturates Screen, urine 05/03/2018 NEGATIVE   Kjzzaz=939 ng/mL Final    Benzodiazepines Screen, urine 05/03/2018 NEGATIVE   Tmnnvg=452 ng/mL Final    Cannabinoid Screen, urine 05/03/2018 NEGATIVE   Cutoff=20 ng/mL Final    Cocaine Metab. Screen, urine 05/03/2018 NEGATIVE   Uqbvpk=940 ng/mL Final    Opiate Screen, urine 05/03/2018 NEGATIVE   Zfsjnx=369 ng/mL Final    Comment: (NOTE)  Opiate test includes Codeine, Morphine, Hydromorphone, Hydrocodone.  Oxycodone/Oxymorphone, urine 05/03/2018 NEGATIVE   Ujgaez=401 ng/mL Final    Comment: (NOTE)  Test includes Oxycodone and Oxymorphone      Phencyclidine Screen, urine 05/03/2018 NEGATIVE   Cutoff=25 ng/mL Final    Methadone Screen, urine 05/03/2018 NEGATIVE   Knindg=657 ng/mL Final    Propoxyphene Screen, urine 05/03/2018 NEGATIVE   Eaybyv=921 ng/mL Final    Buprenorphine, urine 05/03/2018 NEGATIVE   Cutoff=10 ng/mL Final    Creatinine, urine 05/03/2018 145.5  20.0 - 300.0 mg/dL Final    pH, urine 05/03/2018 6.3  4.5 - 8.9   Final    Please note: 05/03/2018 Comment    Final    Comment: (NOTE)  This assay provides a preliminary unconfirmed analytical test  result that may be suitable for clinical management of patients  in certain situations. Drug-test results should be interpreted  in the context of clinical information. Patient metabolic  variables, specific drug chemistry, and specimen  characteristics can affect test outcome.  Technical consultation  is available if a test result is inconsistent with an expected  outcome. ([email or web address] or call toll-free  [phone])  Performed At: [facility]  [address] [place], [place] [id number]  Angelia Chahal MD BW:[id number]         TESTS  Results for orders placed or performed during the hospital encounter of 06/05/18   DRUG SCREEN UR - W/ CONFIRM   Result Value Ref Range    BENZODIAZEPINES NEGATIVE  NEG      BARBITURATES NEGATIVE  NEG      THC (TH-CANNABINOL) NEGATIVE  NEG      OPIATES NEGATIVE  NEG      PCP(PHENCYCLIDINE) NEGATIVE  NEG      COCAINE NEGATIVE  NEG      AMPHETAMINES POSITIVE (A) NEG      METHADONE NEGATIVE  NEG      HDSCOM (NOTE)    AMPHETAMINES CONFIRM, URINE   Result Value Ref Range    Amphetamines Positive (A)      Amphetamine Positive (A)      Amphetamine confirm. 2760 Ptxqtc=638 ng/mL    Methamphetamine NEGATIVE  Ggreoc=574       Assessment/Plan:      1. Attention deficit hyperactivity disorder (ADHD), predominantly hyperactive type - stable   - dextroamphetamine-amphetamine (ADDERALL) 30 mg tablet; Take 1 Tab by mouth two (2) times a dayEarliest Fill Date: 7/6/18. Max Daily Amount: 2 Tabs  Dispense: 60 Tab; Refill: 0  - dextroamphetamine-amphetamine (ADDERALL) 30 mg tablet; Take 1 Tab by mouth two (2) times a dayEarliest Fill Date: 8/6/18. Max Daily Amount: 2 Tabs  Dispense: 60 Tab; Refill: 0  - dextroamphetamine-amphetamine (ADDERALL) 30 mg tablet; Take 1 Tab by mouth two (2) times a dayEarliest Fill Date: 9/6/18. Max Daily Amount: 2 Tabs  Dispense: 60 Tab; Refill: 0    2. Weight loss - stable. Lost 5 lbs last month with no side effects. - phentermine (ADIPEX-P) 37.5 mg tablet; Take 1 Tab by mouth every morning. Max Daily Amount: 37.5 mg.  Dispense: 30 Tab; Refill: 0  - phentermine (ADIPEX-P) 37.5 mg tablet; Take 1 Tab by mouth every morning. Max Daily Amount: 37.5 mg.  Dispense: 30 Tab; Refill: 0    Lab review: orders written for new lab studies as appropriate; see orders.      I have discussed the diagnosis with the patient and the intended plan as seen in the above orders. The patient has received an after-visit summary and questions were answered concerning future plans. I have discussed medication side effects and warnings with the patient as well. I have reviewed the plan of care with the patient, accepted their input and they are in agreement with the treatment goals. F/U in 2-3 months.      Cindy Santiago MD

## 2018-07-07 NOTE — PATIENT INSTRUCTIONS
Attention Deficit Hyperactivity Disorder (ADHD) in Adults: Care Instructions  Your Care Instructions    Attention deficit hyperactivity disorder, or ADHD, is a condition that makes it hard to pay attention. So you may have problems when you try to focus, get organized, and finish tasks. It might make you more active than other people. Or you might do things without thinking first.  ADHD is very common. It usually starts in early childhood. Many adults don't realize they have it until their children are diagnosed. Then they become aware of their own symptoms. Doctors don't know what causes ADHD. But it often runs in families. ADHD can be treated with medicines, behavior training, and counseling. Treatment can improve your life. Follow-up care is a key part of your treatment and safety. Be sure to make and go to all appointments, and call your doctor if you are having problems. It's also a good idea to know your test results and keep a list of the medicines you take. How can you care for yourself at home? · Learn all you can about ADHD. This will help you and your family understand it better. · Take your medicines exactly as prescribed. Call your doctor if you think you are having a problem with your medicine. You will get more details on the specific medicines your doctor prescribes. · If you miss a dose of your medicine, do not take an extra dose. · If your doctor suggests counseling, find a counselor you like and trust. Talk openly and honestly. Be willing to make some changes. · Find a support group for adults with ADHD. Talking to others with the same problems can help you feel better. It can also give you ideas about how to best cope with the condition. · Get rid of distractions at your work space. Keep your desk clean. Try not to face a window or busy hallway. · Use files, planners, and other tools to keep you organized. · Limit use of alcohol, and do not use illegal drugs.  People with ADHD tend to become addicted more easily than others. Tell your doctor if you need help to quit. Counseling, support groups, and sometimes medicines can help you stay free of alcohol or drugs. · Get at least 30 minutes of physical activity on most days of the week. Exercise has been shown to help people cope with ADHD. Walking is a good choice. You also may want to do other activities, such as running, swimming, cycling, or playing tennis or team sports. When should you call for help? Watch closely for changes in your health, and be sure to contact your doctor if:  ? · You feel sad a lot or cry all the time. ? · You have trouble sleeping, or you sleep too much. ? · You find it hard to concentrate, make decisions, or remember things. ? · You change how you normally eat. ? · You feel guilty for no reason. Where can you learn more? Go to http://chasidy-cari.info/. Enter B196 in the search box to learn more about \"Attention Deficit Hyperactivity Disorder (ADHD) in Adults: Care Instructions. \"  Current as of: May 12, 2017  Content Version: 11.4  © 6100-9139 [employer]. Care instructions adapted under license by [employer] (which disclaims liability or warranty for this information). If you have questions about a medical condition or this instruction, always ask your healthcare professional. [place] [address] any warranty or liability for your use of this information. Abnormal Weight Loss: Care Instructions  Your Care Instructions    There are two types of weight loss-normal and abnormal. The normal kind happens when you are trying to lose weight by exercising more or eating less. The abnormal kind happens when you are not trying to lose weight. Many medical problems can cause abnormal weight loss. These include problems with your thyroid gland, long-term infections, mouth or throat problems that make it hard to eat, and digestive problems.  They also include depression and cancer. Some medicines also may cause you to lose weight. You can work with your doctor to find the cause of your weight loss. You will probably need tests to do this. Follow-up care is a key part of your treatment and safety. Be sure to make and go to all appointments, and call your doctor if you are having problems. It's also a good idea to know your test results and keep a list of the medicines you take. How can you care for yourself at home? · Weigh yourself at the same time every day. It's best to do it first thing in the morning after you empty your bladder. Be sure to always wear the same amount of clothing. · Write down any changes in your weight and the possible causes. Discuss these with your doctor. · Your doctor may want you to change your diet. Do your best to follow his or her advice. · Ask your doctor if you should see a dietitian. This is a person who can help you plan meals that work best for your lifestyle. · Note any changes in bowel habits. These may include changes in how often you have a bowel movement. Other changes include the color and size of your stools and how solid they are. · If you are prescribed medicines, take them exactly as prescribed. Call your doctor if you think you are having a problem with your medicine. You will get more details on the specific medicines your doctor prescribes. When should you call for help? Watch closely for changes in your health, and be sure to contact your doctor if:  ? · You do not get better as expected. ? · You continue to lose weight. Where can you learn more? Go to http://chasidy-cari.info/. Enter A790 in the search box to learn more about \"Abnormal Weight Loss: Care Instructions. \"  Current as of: October 13, 2016  Content Version: 11.4  © 1430-8987 [employer], [employer].  Care instructions adapted under license by [employer] (which disclaims liability or warranty for this information). If you have questions about a medical condition or this instruction, always ask your healthcare professional. [place] [address] any warranty or liability for your use of this information.

## 2018-08-03 ENCOUNTER — TELEPHONE (OUTPATIENT)
Dept: FAMILY MEDICINE CLINIC | Age: 38
End: 2018-08-03

## 2018-08-03 NOTE — TELEPHONE ENCOUNTER
Prior Auth for Phentermine HCL 37.5 mg sent to Cover My Meds for authorization. Awaiting answer.  Thank you

## 2018-08-06 DIAGNOSIS — G43.009 MIGRAINE WITHOUT AURA AND WITHOUT STATUS MIGRAINOSUS, NOT INTRACTABLE: ICD-10-CM

## 2018-08-07 RX ORDER — SUMATRIPTAN 50 MG/1
TABLET, FILM COATED ORAL
Qty: 30 TAB | Refills: 0 | Status: SHIPPED | OUTPATIENT
Start: 2018-08-07

## 2018-09-04 ENCOUNTER — OFFICE VISIT (OUTPATIENT)
Dept: FAMILY MEDICINE CLINIC | Age: 38
End: 2018-09-04

## 2018-09-04 VITALS
OXYGEN SATURATION: 98 % | DIASTOLIC BLOOD PRESSURE: 83 MMHG | WEIGHT: 164 LBS | RESPIRATION RATE: 18 BRPM | HEIGHT: 63 IN | HEART RATE: 79 BPM | TEMPERATURE: 97.9 F | BODY MASS INDEX: 29.06 KG/M2 | SYSTOLIC BLOOD PRESSURE: 121 MMHG

## 2018-09-04 DIAGNOSIS — K05.00 GINGIVITIS, ACUTE: Primary | ICD-10-CM

## 2018-09-04 DIAGNOSIS — F90.1 ATTENTION DEFICIT HYPERACTIVITY DISORDER (ADHD), PREDOMINANTLY HYPERACTIVE TYPE: ICD-10-CM

## 2018-09-04 DIAGNOSIS — R63.4 WEIGHT LOSS: ICD-10-CM

## 2018-09-04 RX ORDER — DEXTROAMPHETAMINE SACCHARATE, AMPHETAMINE ASPARTATE, DEXTROAMPHETAMINE SULFATE AND AMPHETAMINE SULFATE 7.5; 7.5; 7.5; 7.5 MG/1; MG/1; MG/1; MG/1
30 TABLET ORAL 2 TIMES DAILY
Qty: 60 TAB | Refills: 0 | Status: SHIPPED | OUTPATIENT
Start: 2018-11-04

## 2018-09-04 RX ORDER — PHENTERMINE HYDROCHLORIDE 37.5 MG/1
37.5 TABLET ORAL
Qty: 30 TAB | Refills: 0 | Status: SHIPPED | OUTPATIENT
Start: 2018-09-04

## 2018-09-04 RX ORDER — DEXTROAMPHETAMINE SACCHARATE, AMPHETAMINE ASPARTATE, DEXTROAMPHETAMINE SULFATE AND AMPHETAMINE SULFATE 7.5; 7.5; 7.5; 7.5 MG/1; MG/1; MG/1; MG/1
30 TABLET ORAL 2 TIMES DAILY
Qty: 60 TAB | Refills: 0 | Status: SHIPPED | OUTPATIENT
Start: 2018-09-04

## 2018-09-04 RX ORDER — DEXTROAMPHETAMINE SACCHARATE, AMPHETAMINE ASPARTATE, DEXTROAMPHETAMINE SULFATE AND AMPHETAMINE SULFATE 7.5; 7.5; 7.5; 7.5 MG/1; MG/1; MG/1; MG/1
30 TABLET ORAL 2 TIMES DAILY
Qty: 60 TAB | Refills: 0 | Status: SHIPPED | OUTPATIENT
Start: 2018-10-06

## 2018-09-04 RX ORDER — AMOXICILLIN 500 MG/1
500 CAPSULE ORAL 3 TIMES DAILY
Qty: 30 CAP | Refills: 0 | Status: SHIPPED | OUTPATIENT
Start: 2018-09-04 | End: 2018-09-14

## 2018-09-04 NOTE — MR AVS SNAPSHOT
[address] [address] 
[phone] Patient: Guillermo Khan MRN: [id number] IIL:[id number] Visit Information Date & Time Provider Department Dept. Phone Encounter #  
 9/4/2018  1:30 PM Corrie Yang MD [facility] [phone] [id number] Upcoming Health Maintenance Date Due Pneumococcal 19-64 Medium Risk (1 of 1 - PPSV23) 7/31/1997 DTaP/Tdap/Td series (1 - Tdap) 7/31/1999 MEDICARE YEARLY EXAM 3/14/2018 Influenza Age 5 to Adult 8/1/2018 PAP AKA CERVICAL CYTOLOGY 11/23/2018 Allergies as of 9/4/2018  Review Complete On: 5/3/2018 By: YONATAN Diaz Severity Noted Reaction Type Reactions Other Plant, Animal, Environmental  08/05/2015    Sneezing Seasonal  
  
Current Immunizations  Never Reviewed No immunizations on file. Not reviewed this visit You Were Diagnosed With   
  
 Codes Comments Gingivitis, acute    -  Primary ICD-10-CM: K05.00 ICD-9-CM: 523.00 Attention deficit hyperactivity disorder (ADHD), predominantly hyperactive type     ICD-10-CM: F90.1 ICD-9-CM: 314.01 Weight loss     ICD-10-CM: R63.4 ICD-9-CM: 783.21 Vitals BP Pulse Temp Resp Height(growth percentile) Weight(growth percentile) 121/83 79 97.9 °F (36.6 °C) (Oral) 18 5' 3\" (1.6 m) 164 lb (74.4 kg) SpO2 BMI OB Status Smoking Status 98% 29.05 kg/m2 Having regular periods Current Every Day Smoker BMI and BSA Data Body Mass Index Body Surface Area 29.05 kg/m 2 1.82 m 2 Preferred Pharmacy Pharmacy Name Phone [place], [address] [facility] [phone] Your Updated Medication List  
  
   
This list is accurate as of 9/4/18  1:52 PM.  Always use your most recent med list.  
  
  
  
  
 amoxicillin 500 mg capsule Commonly known as:  AMOXIL Take 1 Cap by mouth three (3) times daily for 10 days. butalbital-acetaminophen-caffeine -40 mg per tablet Commonly known as:  FIORICET, ESGIC  
TAKE 1 TABLET BY MOUTH EVERY 4 HOURS AS NEEDED FOR PAIN  
  
 citalopram 40 mg tablet Commonly known as:  Tammie Mutters Take 1 Tab by mouth daily. * dextroamphetamine-amphetamine 30 mg tablet Commonly known as:  ADDERALL Take 1 Tab by mouth two (2) times a dayEarliest Fill Date: 9/4/18. Max Daily Amount: 2 Tabs * dextroamphetamine-amphetamine 30 mg tablet Commonly known as:  ADDERALL Take 1 Tab by mouth two (2) times a dayEarliest Fill Date: 10/6/18. Max Daily Amount: 2 Tabs Start taking on:  10/6/2018 * dextroamphetamine-amphetamine 30 mg tablet Commonly known as:  ADDERALL Take 1 Tab by mouth two (2) times a dayEarliest Fill Date: 11/4/18. Max Daily Amount: 2 Tabs Start taking on:  11/4/2018 Multivit-Iron-Min-Folic Acid 95-0.1 mg Tab Commonly known as:  CENTRUM ULTRA WOMEN'S Take one tab po daily * phentermine 37.5 mg tablet Commonly known as:  ADIPEX-P Take 1 Tab by mouth every morning. Max Daily Amount: 37.5 mg.  
  
 * phentermine 37.5 mg tablet Commonly known as:  ADIPEX-P Take 1 Tab by mouth every morning. Max Daily Amount: 37.5 mg.  
  
 * SUMAtriptan 50 mg tablet Commonly known as:  IMITREX Take 1 Tab by mouth once as needed for Migraine for up to 1 dose. * SUMAtriptan 50 mg tablet Commonly known as:  IMITREX  
TAKE ONE TABLET BY MOUTH ONCE AS NEEDED FOR MIGRAINE FOR UP TO 1 DOSE  
  
 * triamcinolone acetonide 0.1 % topical cream  
Commonly known as:  KENALOG Apply  to affected area two (2) times a day. use thin layer BID x 2 weeks * triamcinolone acetonide 0.1 % topical cream  
Commonly known as:  KENALOG Apply  to affected area two (2) times a day. use thin layer BID no more than 1-2 weeks at a time. valACYclovir 1 gram tablet Commonly known as:  VALTREX Take 1 Tab by mouth three (3) times daily. Indications: herpes zoster * varenicline 0.5 mg (11)- 1 mg (42) Dspk Commonly known as:  CHANTIX STARTER NABILA Use as directed starting pack * varenicline 1 mg tablet Commonly known as:  [address] Take 1 Tab by mouth two (2) times a day. * Notice: This list has 11 medication(s) that are the same as other medications prescribed for you. Read the directions carefully, and ask your doctor or other care provider to review them with you. Prescriptions Printed Refills  
 dextroamphetamine-amphetamine (ADDERALL) 30 mg tablet 0 Sig: Take 1 Tab by mouth two (2) times a dayEarliest Fill Date: 9/4/18. Max Daily Amount: 2 Tabs Class: Print Route: Oral  
 dextroamphetamine-amphetamine (ADDERALL) 30 mg tablet 0 Starting on: 11/4/2018 Sig: Take 1 Tab by mouth two (2) times a dayEarliest Fill Date: 11/4/18. Max Daily Amount: 2 Tabs Class: Print Route: Oral  
 dextroamphetamine-amphetamine (ADDERALL) 30 mg tablet 0 Starting on: 10/6/2018 Sig: Take 1 Tab by mouth two (2) times a dayEarliest Fill Date: 10/6/18. Max Daily Amount: 2 Tabs Class: Print Route: Oral  
 phentermine (ADIPEX-P) 37.5 mg tablet 0 Sig: Take 1 Tab by mouth every morning. Max Daily Amount: 37.5 mg.  
 Class: Print Route: Oral  
  
Prescriptions Sent to Pharmacy Refills  
 amoxicillin (AMOXIL) 500 mg capsule 0 Sig: Take 1 Cap by mouth three (3) times daily for 10 days. Class: Normal  
 Pharmacy: [facility] [address] [place] [address], [address] [place] [address] #: [phone] Route: Oral  
  
Introducing [facility]! [employer] introduces [employer] patient portal. Now you can access parts of your medical record, email your doctor's office, and request medication refills online.    
 
1. In your internet browser, go to [email or web address] 2. Click on the First Time User? Click Here link in the Sign In box. You will see the New Member Sign Up page. 3. Enter your [employer] Access Code exactly as it appears below. You will not need to use this code after youve completed the sign-up process. If you do not sign up before the expiration date, you must request a new code. · [employer] Access Code: [email or web address] Expires: 12/3/2018  1:52 PM 
 
4. Enter the last four digits of your Social Security Number (xxxx) and Date of Birth (mm/dd/yyyy) as indicated and click Submit. You will be taken to the next sign-up page. 5. Create a [employer]EM[employer] ID. This will be your [employer] login ID and cannot be changed, so think of one that is secure and easy to remember. 6. Create a [employer] password. You can change your password at any time. 7. Enter your Password Reset Question and Answer. This can be used at a later time if you forget your password. 8. Enter your e-mail address. You will receive e-mail notification when new information is available in [address] 9. Click Sign Up. You can now view and download portions of your medical record. 10. Click the Download Summary menu link to download a portable copy of your medical information. If you have questions, please visit the Frequently Asked Questions section of the [employer] website. Remember, [employer] is NOT to be used for urgent needs. For medical emergencies, dial 911. Now available from your iPhone and Android! Please provide this summary of care documentation to your next provider. Your primary care clinician is listed as [address]. If you have any questions after today's visit, please call [phone].

## 2018-09-04 NOTE — PROGRESS NOTES
Rosalba Gaspar is a 34 y.o.  female and presents with F/U for ADD and weight management. No current complaint or significant side effects. Chief Complaint   Patient presents with    Medication Refill     Subjective: Additional Concerns: none    Patient Active Problem List    Diagnosis Date Noted    Depression 05/12/2016    Insomnia 03/24/2016    Pruritus 03/24/2016    History of iron deficiency anemia 03/24/2016    ADHD (attention deficit hyperactivity disorder) 08/05/2015    Migraines 08/05/2015    Weight gain 08/05/2015    Encounter for smoking cessation counseling 08/05/2015     Current Outpatient Prescriptions   Medication Sig Dispense Refill    dextroamphetamine-amphetamine (ADDERALL) 30 mg tablet Take 1 Tab by mouth two (2) times a dayEarliest Fill Date: 9/4/18. Max Daily Amount: 2 Tabs 60 Tab 0    [START ON 11/4/2018] dextroamphetamine-amphetamine (ADDERALL) 30 mg tablet Take 1 Tab by mouth two (2) times a dayEarliest Fill Date: 11/4/18. Max Daily Amount: 2 Tabs 60 Tab 0    [START ON 10/6/2018] dextroamphetamine-amphetamine (ADDERALL) 30 mg tablet Take 1 Tab by mouth two (2) times a dayEarliest Fill Date: 10/6/18. Max Daily Amount: 2 Tabs 60 Tab 0    phentermine (ADIPEX-P) 37.5 mg tablet Take 1 Tab by mouth every morning. Max Daily Amount: 37.5 mg. 30 Tab 0    phentermine (ADIPEX-P) 37.5 mg tablet Take 1 Tab by mouth every morning. Max Daily Amount: 37.5 mg. 30 Tab 0    citalopram (CELEXA) 40 mg tablet Take 1 Tab by mouth daily. 90 Tab 1    butalbital-acetaminophen-caffeine (FIORICET, ESGIC) -40 mg per tablet TAKE 1 TABLET BY MOUTH EVERY 4 HOURS AS NEEDED FOR PAIN 30 Tab 3    SUMAtriptan (IMITREX) 50 mg tablet Take 1 Tab by mouth once as needed for Migraine for up to 1 dose. 30 Tab 1    triamcinolone acetonide (KENALOG) 0.1 % topical cream Apply  to affected area two (2) times a day.  use thin layer BID x 2 weeks 30 g 1    Multivit-Iron-Min-Folic Acid (CENTRUM ULTRA WOMEN'S) 18-0.4 mg tab Take one tab po daily 90 Tab 3    SUMAtriptan (IMITREX) 50 mg tablet TAKE ONE TABLET BY MOUTH ONCE AS NEEDED FOR MIGRAINE FOR UP TO 1 DOSE 30 Tab 0    valACYclovir (VALTREX) 1 gram tablet Take 1 Tab by mouth three (3) times daily. Indications: herpes zoster 21 Tab 0    varenicline (CHANTIX STARTER NABILA) 0.5 mg (11)- 1 mg (42) DsPk Use as directed starting pack 1 Dose Pack 0    varenicline (CHANTIX CONTINUING MONTH BOX) 1 mg tablet Take 1 Tab by mouth two (2) times a day. 60 Tab 1    triamcinolone acetonide (KENALOG) 0.1 % topical cream Apply  to affected area two (2) times a day. use thin layer BID no more than 1-2 weeks at a time.  15 g 2     Allergies   Allergen Reactions    Other Plant, Animal, Environmental Sneezing     Seasonal       Past Medical History:   Diagnosis Date    ADHD (attention deficit hyperactivity disorder)     Depression     Encounter for smoking cessation counseling     Headache      Past Surgical History:   Procedure Laterality Date    HX  SECTION      HX TUBAL LIGATION      HX WISDOM TEETH EXTRACTION       Family History   Problem Relation Age of Onset   [facility] Cancer Father      brain    Depression Mother     Attention Deficit Disorder Sister     Cancer Maternal Grandmother      Social History   Substance Use Topics    Smoking status: Current Every Day Smoker     Packs/day: 0.50     Start date: 1997    Smokeless tobacco: Never Used    Alcohol use 0.0 oz/week     0 Standard drinks or equivalent per week      Comment: occassional     ROS     General: negative for - chills, fatigue, fever, weight change  Psych: negative for - anxiety, depression, irritability or mood swings  ENT: negative for - headaches, hearing change, nasal congestion, oral lesions, sneezing or sore throat  Heme/ Lymph: negative for - bleeding problems, bruising, pallor or swollen lymph nodes  Endo: negative for - hot flashes, polydipsia/polyuria or temperature intolerance  Resp: negative for - cough, shortness of breath or wheezing  CV: negative for - chest pain, edema or palpitations  GI: negative for - abdominal pain, change in bowel habits, constipation, diarrhea or nausea/vomiting  : negative for - dysuria, hematuria, incontinence, pelvic pain or vulvar/vaginal symptoms  MSK: negative for - joint pain, joint swelling or muscle pain  Neuro: negative for - confusion, headaches, seizures or weakness  Derm: negative for - dry skin, hair changes, rash or skin lesion changes    Objective:  Vitals:    09/04/18 1338   BP: 121/83   Pulse: 79   Resp: 18   Temp: 97.9 °F (36.6 °C)   TempSrc: Oral   SpO2: 98%   Weight: 164 lb (74.4 kg)   Height: 5' 3\" (1.6 m)     PE    Alert, well appearing, and in no distress, oriented to person, place, and time and overweight  General appearance - alert, well appearing, and in no distress, oriented to person, place, and time and overweight  Mental status - alert, oriented to person, place, and time, normal mood, behavior, speech, dress, motor activity, and thought processes  Chest - clear to auscultation, no wheezes, rales or rhonchi, symmetric air entry  Heart - normal rate, regular rhythm, normal S1, S2, no murmurs, rubs, clicks or gallops  Extremities - peripheral pulses normal, no pedal edema, no clubbing or cyanosis    [facility] on [facility]   Component Date Value Ref Range Status    BENZODIAZEPINES 06/05/2018 NEGATIVE   NEG   Final    BARBITURATES 06/05/2018 NEGATIVE   NEG   Final    THC (TH-CANNABINOL) 06/05/2018 NEGATIVE   NEG   Final    OPIATES 06/05/2018 NEGATIVE   NEG   Final    PCP(PHENCYCLIDINE) 06/05/2018 NEGATIVE   NEG   Final    COCAINE 06/05/2018 NEGATIVE   NEG   Final    AMPHETAMINES 06/05/2018 POSITIVE* NEG   Final    INITIAL SCREEN POSITIVE,CONFIRMATION TO FOLLOW    METHADONE 06/05/2018 NEGATIVE   NEG   Final    HDSCOM 06/05/2018 (NOTE)   Final    Comment: Specimen analysis was performed without chain of custody handling. These results should be used for medical purposes only and not for   legal or employment purposes. Unconfirmed screening results must not   be used for non-medical purposes. The cut-off concentration for positive results are as follows:    AMPH     1000 ng/mL  TEGAN      200 ng/mL  VAZQUEZ      200 ng/mL  ANDREA       300 ng/mL  METH      300 ng/mL  OPI       300 ng/mL  PCP        25 ng/mL  THC        50 ng/mL        Amphetamines 06/05/2018 Positive*   Final    Comment: (NOTE)  Amphetamine test includes Amphetamine and Methamphetamine.  Amphetamine 06/05/2018 Positive*   Final    Amphetamine confirm. 06/05/2018 2760  Nppykd=648 ng/mL Final    Methamphetamine 06/05/2018 NEGATIVE   Jurxby=198   Final    Comment: (NOTE)  Performed At:  [facility]  [facility] [address] [place], [place] [id number]  Titus Skiff MD SZ:[id number]     Hospital Outpatient Visit on 05/03/2018   Component Date Value Ref Range Status    Amphetamine Screen, urine 05/03/2018 NEGATIVE   Tbqonv=4066 ng/mL Final    Barbiturates Screen, urine 05/03/2018 NEGATIVE   Mabvit=476 ng/mL Final    Benzodiazepines Screen, urine 05/03/2018 NEGATIVE   Osbajk=013 ng/mL Final    Cannabinoid Screen, urine 05/03/2018 NEGATIVE   Cutoff=20 ng/mL Final    Cocaine Metab. Screen, urine 05/03/2018 NEGATIVE   Pwwuri=867 ng/mL Final    Opiate Screen, urine 05/03/2018 NEGATIVE   Hzbzak=208 ng/mL Final    Comment: (NOTE)  Opiate test includes Codeine, Morphine, Hydromorphone, Hydrocodone.       Oxycodone/Oxymorphone, urine 05/03/2018 NEGATIVE   Wbkijv=000 ng/mL Final    Comment: (NOTE)  Test includes Oxycodone and Oxymorphone      Phencyclidine Screen, urine 05/03/2018 NEGATIVE   Cutoff=25 ng/mL Final    Methadone Screen, urine 05/03/2018 NEGATIVE   Pacuel=490 ng/mL Final    Propoxyphene Screen, urine 05/03/2018 NEGATIVE   Xddruf=614 ng/mL Final    Buprenorphine, urine 05/03/2018 NEGATIVE   Cutoff=10 ng/mL Final    Creatinine, urine 05/03/2018 145.5  20.0 - 300.0 mg/dL Final    pH, urine 05/03/2018 6.3  4.5 - 8.9   Final    Please note: 05/03/2018 Comment    Final    Comment: (NOTE)  This assay provides a preliminary unconfirmed analytical test  result that may be suitable for clinical management of patients  in certain situations. Drug-test results should be interpreted  in the context of clinical information. Patient metabolic  variables, specific drug chemistry, and specimen  characteristics can affect test outcome. Technical consultation  is available if a test result is inconsistent with an expected  outcome. ([email or web address] or call toll-free  [phone])  Performed At: [facility]  [facility] [address] [place], [place] [id number]  Adán Carroll MD JR:[id number]         TESTS  Results for orders placed or performed during the hospital encounter of 06/05/18   DRUG SCREEN UR - W/ CONFIRM   Result Value Ref Range    BENZODIAZEPINES NEGATIVE  NEG      BARBITURATES NEGATIVE  NEG      THC (TH-CANNABINOL) NEGATIVE  NEG      OPIATES NEGATIVE  NEG      PCP(PHENCYCLIDINE) NEGATIVE  NEG      COCAINE NEGATIVE  NEG      AMPHETAMINES POSITIVE (A) NEG      METHADONE NEGATIVE  NEG      HDSCOM (NOTE)    AMPHETAMINES CONFIRM, URINE   Result Value Ref Range    Amphetamines Positive (A)      Amphetamine Positive (A)      Amphetamine confirm. 2760 Vstdjy=921 ng/mL    Methamphetamine NEGATIVE  Vpoxck=149       Assessment/Plan:      1. Attention deficit hyperactivity disorder (ADHD), predominantly hyperactive type - stable   - dextroamphetamine-amphetamine (ADDERALL) 30 mg tablet; Take 1 Tab by mouth two (2) times a dayEarliest Fill Date: 9/4/18. Max Daily Amount: 2 Tabs  Dispense: 60 Tab; Refill: 0  - dextroamphetamine-amphetamine (ADDERALL) 30 mg tablet; Take 1 Tab by mouth two (2) times a dayEarliest Fill Date: 11/4/18. Max Daily Amount: 2 Tabs  Dispense: 60 Tab; Refill: 0  - dextroamphetamine-amphetamine (ADDERALL) 30 mg tablet;  Take 1 Tab by mouth two (2) times a dayEarliest Fill Date: 10/6/18. Max Daily Amount: 2 Tabs  Dispense: 60 Tab; Refill: 0    2. Weight loss  - phentermine (ADIPEX-P) 37.5 mg tablet; Take 1 Tab by mouth every morning. Max Daily Amount: 37.5 mg.  Dispense: 30 Tab; Refill: 0    Lab review: orders written for new lab studies as appropriate; see orders. I have discussed the diagnosis with the patient and the intended plan as seen in the above orders. The patient has received an after-visit summary and questions were answered concerning future plans. I have discussed medication side effects and warnings with the patient as well. I have reviewed the plan of care with the patient, accepted their input and they are in agreement with the treatment goals. F/U as needed. Routine 3 months.      Farida Hodge MD

## 2018-09-05 NOTE — PATIENT INSTRUCTIONS
Attention Deficit Hyperactivity Disorder (ADHD) in Adults: Care Instructions  Your Care Instructions    Attention deficit hyperactivity disorder, or ADHD, is a condition that makes it hard to pay attention. So you may have problems when you try to focus, get organized, and finish tasks. It might make you more active than other people. Or you might do things without thinking first.  ADHD is very common. It usually starts in early childhood. Many adults don't realize they have it until their children are diagnosed. Then they become aware of their own symptoms. Doctors don't know what causes ADHD. But it often runs in families. ADHD can be treated with medicines, behavior training, and counseling. Treatment can improve your life. Follow-up care is a key part of your treatment and safety. Be sure to make and go to all appointments, and call your doctor if you are having problems. It's also a good idea to know your test results and keep a list of the medicines you take. How can you care for yourself at home? · Learn all you can about ADHD. This will help you and your family understand it better. · Take your medicines exactly as prescribed. Call your doctor if you think you are having a problem with your medicine. You will get more details on the specific medicines your doctor prescribes. · If you miss a dose of your medicine, do not take an extra dose. · If your doctor suggests counseling, find a counselor you like and trust. Talk openly and honestly. Be willing to make some changes. · Find a support group for adults with ADHD. Talking to others with the same problems can help you feel better. It can also give you ideas about how to best cope with the condition. · Get rid of distractions at your work space. Keep your desk clean. Try not to face a window or busy hallway. · Use files, planners, and other tools to keep you organized. · Limit use of alcohol, and do not use illegal drugs.  People with ADHD tend to become addicted more easily than others. Tell your doctor if you need help to quit. Counseling, support groups, and sometimes medicines can help you stay free of alcohol or drugs. · Get at least 30 minutes of physical activity on most days of the week. Exercise has been shown to help people cope with ADHD. Walking is a good choice. You also may want to do other activities, such as running, swimming, cycling, or playing tennis or team sports. When should you call for help? Watch closely for changes in your health, and be sure to contact your doctor if:    · You feel sad a lot or cry all the time.     · You have trouble sleeping, or you sleep too much.     · You find it hard to concentrate, make decisions, or remember things.     · You change how you normally eat.     · You feel guilty for no reason. Where can you learn more? Go to http://ch[employer]idy[employer] Analyticscari.info/. Enter B196 in the search box to learn more about \"Attention Deficit Hyperactivity Disorder (ADHD) in Adults: Care Instructions. \"  Current as of: December 7, 2017  Content Version: 11.7  © 9939-1472 [employer]. Care instructions adapted under license by [employer] (which disclaims liability or warranty for this information). If you have questions about a medical condition or this instruction, always ask your healthcare professional. [address] any warranty or liability for your use of this information. Learning About Obesity  What is obesity? Obesity means having so much body fat that your health is in danger. Having too much body fat can lead to type 2 diabetes, heart disease, high blood pressure, arthritis, sleep apnea, and stroke. Even if you don't feel bad now, think about these health risks. Do they seem like a good reason to start on a new path toward a healthier weight? Or do you have another personal, powerful reason for wanting to lose weight?  Whatever it is, keep it in mind. It can be hard to change eating habits and exercise habits. But with your own reason and plan, you can do it. How do you know if your weight is in the obesity range? To know if your weight is in the obesity range, your doctor looks at your body mass index (BMI) and waist size. Your BMI is a number that is calculated from your weight and your height. To figure your BMI for yourself, get a BMI table from your doctor or use an online tool, such as http://www.dye.[employer]/ on the [employer] of [employer]. A healthy BMI is from 18.5 to 24.9. If your BMI is from 30.0 to 39.9, you are considered to have obesity. If your BMI is over 40.0, you are considered to have extreme obesity. What causes obesity? When you take in more calories than you burn off, you gain weight. How you eat, how active you are, and other things affect how your body uses calories and whether you gain weight. If you have family members who have too much body fat, you may have inherited a tendency to gain weight. And your family also helps form your eating and lifestyle habits, which can lead to obesity. Also, our busy lives make it harder to plan and cook healthy meals. For many of us, it's easier to reach for prepared foods, go out to eat, or go to the drive-through. But these foods are often high in saturated fat and calories. Portions are often too large. What can you do to reach a healthy weight? Focus on health, not diets. Diets are hard to stay on and don't work in the long run. It is very hard to stay with a diet that includes lots of big changes in your eating habits. Instead of a diet, focus on lifestyle changes that will improve your health and achieve the right balance of energy and calories. To lose weight, you need to burn more calories than you take in.  You can do it by eating healthy foods in reasonable amounts and becoming more active, even a little bit every day. Making small changes over time can add up to a lot. Make a plan for change. Many people have found that naming their reasons for change and staying focused on their plan can make a big difference. Work with your doctor to create a plan that is right for you. · Ask yourself: [place] are my personal, most powerful reasons for wanting this change? What will my life look like when I've made the change? \"  · Set your long-term goal. Make it specific, such as \"I will lose x pounds. \"  · Break your long-term goal into smaller, short-term goals. Make these small steps specific and within your reach, things you know you can do. These steps are what keep you going from day to day. Talk with your doctor about other weight-loss options. If you have a BMI in a certain range and have not been able to lose weight with diet and exercise, medicine or surgery may be an option for you. Before your doctor will prescribe medicines or surgery, he or she will probably want you to be more active and follow your healthy eating plan for a period of time. These habits are key lifelong changes for managing your weight, with or without other medical treatment. And these changes can help you avoid weight-related health problems. How can you stay on your plan for change? Be ready. Choose to start during a time when there are few events that might trigger slip-ups, like holidays, social events, and high-stress periods. Decide on your first few steps. Most people have more success when they make small changes, one step at a time. For example, you might switch a daily candy bar to a piece of fruit, walk 10 minutes more, or add more vegetables to a meal.  Line up your support people. Make sure you're not going to be alone as you make this change. Connect with people who understand how important it is to you. Ask family members and friends for help in keeping with your plan.  And think about who could make it harder for you, and how to handle them.  Try tracking. People who keep track of what they eat, feel, and do are better at losing weight. Try writing down things like:  · What and how much you eat. · How you feel before and after each meal.  · Details about each meal (like eating out or at home, eating alone, or with friends or family). · What you do to be active. Look and plan. As you track, look for patterns that you may want to change. Take note of:  · When you eat and whether you skip meals. · How often you eat out. · How many fruits and vegetables you eat. · When you eat beyond feeling full. · When and why you eat for reasons other than being hungry. When you stray from your plan, don't get upset. Figure out what made you slip up and how you can fix it. Can you take medicines or have surgery to lose weight? If you have a BMI in a certain range and have not been able to lose weight with diet and exercise, medicine or surgery may be an option for you. If you have a BMI of at least 30.0 (or a BMI of at least 27.0 and another health problem related to your weight), ask your doctor about weight-loss medicines. They work by making you feel less hungry, making you feel full more quickly, or changing how you digest fat. Medicines are used along with diet changes and more physical activity to help you make lasting changes. If you have a BMI of 40.0 or more (or a BMI of 35.0 or more and another health problem related to your weight), your doctor may talk with you about surgery. Weight-loss surgery has risks, and you will need to work with your doctor to compare the risk of having obesity with the risks of surgery. With any option you choose, you will still need to eat a healthy diet and get regular exercise. Follow-up care is a key part of your treatment and safety. Be sure to make and go to all appointments, and call your doctor if you are having problems.  It's also a good idea to know your test results and keep a list of the medicines you take.  Where can you learn more? Go to http://chasidy-cari.info/. Enter N111 in the search box to learn more about \"Learning About Obesity. \"  Current as of: October 9, 2017  Content Version: 11.7  © 6518-7619 [employer]. Care instructions adapted under license by [employer] (which disclaims liability or warranty for this information). If you have questions about a medical condition or this instruction, always ask your healthcare professional. [place] [address] any warranty or liability for your use of this information.

## 2023-02-07 NOTE — PATIENT INSTRUCTIONS
Attention Deficit Hyperactivity Disorder (ADHD) in Adults: Care Instructions  Your Care Instructions    Attention deficit hyperactivity disorder, or ADHD, is a condition that makes it hard to pay attention. So you may have problems when you try to focus, get organized, and finish tasks. It might make you more active than other people. Or you might do things without thinking first.  ADHD is very common. It usually starts in early childhood. Many adults don't realize they have it until their children are diagnosed. Then they become aware of their own symptoms. Doctors don't know what causes ADHD. But it often runs in families. ADHD can be treated with medicines, behavior training, and counseling. Treatment can improve your life. Follow-up care is a key part of your treatment and safety. Be sure to make and go to all appointments, and call your doctor if you are having problems. It's also a good idea to know your test results and keep a list of the medicines you take. How can you care for yourself at home? · Learn all you can about ADHD. This will help you and your family understand it better. · Take your medicines exactly as prescribed. Call your doctor if you think you are having a problem with your medicine. You will get more details on the specific medicines your doctor prescribes. · If you miss a dose of your medicine, do not take an extra dose. · If your doctor suggests counseling, find a counselor you like and trust. Talk openly and honestly. Be willing to make some changes. · Find a support group for adults with ADHD. Talking to others with the same problems can help you feel better. It can also give you ideas about how to best cope with the condition. · Get rid of distractions at your work space. Keep your desk clean. Try not to face a window or busy hallway. · Use files, planners, and other tools to keep you organized. · Limit use of alcohol, and do not use illegal drugs.  People with ADHD tend to become addicted more easily than others. Tell your doctor if you need help to quit. Counseling, support groups, and sometimes medicines can help you stay free of alcohol or drugs. · Get at least 30 minutes of physical activity on most days of the week. Exercise has been shown to help people cope with ADHD. Walking is a good choice. You also may want to do other activities, such as running, swimming, cycling, or playing tennis or team sports. When should you call for help? Watch closely for changes in your health, and be sure to contact your doctor if:  ? · You feel sad a lot or cry all the time. ? · You have trouble sleeping, or you sleep too much. ? · You find it hard to concentrate, make decisions, or remember things. ? · You change how you normally eat. ? · You feel guilty for no reason. Where can you learn more? Go to http://chasidy-cari.info/. Enter B196 in the search box to learn more about \"Attention Deficit Hyperactivity Disorder (ADHD) in Adults: Care Instructions. \"  Current as of: May 12, 2017  Content Version: 11.4  © 0903-0818 [employer]. Care instructions adapted under license by [employer] (which disclaims liability or warranty for this information). If you have questions about a medical condition or this instruction, always ask your healthcare professional. [address] any warranty or liability for your use of this information. Yes